# Patient Record
Sex: FEMALE | Race: WHITE | NOT HISPANIC OR LATINO | Employment: OTHER | URBAN - METROPOLITAN AREA
[De-identification: names, ages, dates, MRNs, and addresses within clinical notes are randomized per-mention and may not be internally consistent; named-entity substitution may affect disease eponyms.]

---

## 2020-03-11 ENCOUNTER — TELEPHONE (OUTPATIENT)
Dept: PAIN MEDICINE | Facility: CLINIC | Age: 70
End: 2020-03-11

## 2020-03-11 NOTE — TELEPHONE ENCOUNTER
"  Chief Complaint: \"Pain in my lower back.\"       History of Present Illness:   Patient: Ms. Orquidea Dodge, 69 y.o. female   Referring Physician: Abby Jaime APRN   Reason for Referral: Consultation for chronic intractable lower back pain.   Pain History: Patient reports a _____ year history of lower back pain, which began without incident. Pain has progressed in intensity over the past ______years.   Pain Description: constant pain with intermittent exacerbation, described as aching, burning, dull and throbbing sensation.   Radiation of Pain: The pain does not radiate.   Pain intensity today: _____/10   Average pain intensity last week: ____/10   Pain intensity ranges from: ____/10 to ____/10   Aggravating factors:   Pain increases with twisting, bending, standing longer than _____ and walking______.   Alleviating factors: Pain decreases with sitting and lying.     Associated Symptoms:   Patient denies pain, numbness or weakness in the lower extremities.   Patient denies any new bladder or bowel problems.   Patient denies difficulties with her balance or recent falls     Review of previous therapies and additional medical records:   Orquidea Dodge has already failed the following measures, including:   Conservative Measures: Oral analgesics, topical analgesics and physical therapy   Interventional Measures: _____None   Surgical Measures: _____No previous history of lumbar spine surgery   Orquidea Dodge ____underwent neurosurgical consultation with  ___- on ______, and was found not to be a surgical candidate.   Patient underwent neurology consultation with Dr. Henok Fletcher on 06/11/2019 and was diagnosed with diabetic polyneuropathy   Orquidea Dodge presents with significant comorbidities including   _________ diabetes, diabetic polyneuropathy, CAD,HTN, osteoporosis, OSA_____, _____nicotine addiction, COPD   In terms of current analgesics, Orquidea Dodge takes: ______   I have reviewed [facility] Report " #________ consistent to medication reconciliation.   SOAPP: ____________     Review of Diagnostic Studies:     MRI of the lumbar spine 01/15/2020, radiology report: grade 1 spondylolisthesis of L4 on L5. The conus has normal appearance and level of termination. Axial imaging:   L1-L2: Mild facet arthritis. No canal or NF stenosis   L2-L3: Moderate facet arthritis. No canal or NF stenosis   L3-L4: Moderate facet arthritis. No canal or NF stenosis   L4-L5: Severe facet arthritis. No canal or NF stenosis   L5-S1: Severe facet arthritis. No canal or NF stenosis

## 2020-03-14 PROBLEM — M81.0 OSTEOPOROSIS: Status: ACTIVE | Noted: 2020-03-14

## 2020-03-14 PROBLEM — E66.01 MORBID OBESITY: Status: ACTIVE | Noted: 2020-03-14

## 2020-03-14 PROBLEM — G47.33 OSA (OBSTRUCTIVE SLEEP APNEA): Status: ACTIVE | Noted: 2020-03-14

## 2020-03-14 PROBLEM — Z71.6 TOBACCO ABUSE COUNSELING: Status: ACTIVE | Noted: 2020-03-14

## 2020-03-14 PROBLEM — J44.9 CHRONIC OBSTRUCTIVE PULMONARY DISEASE: Status: ACTIVE | Noted: 2020-03-14

## 2020-03-14 PROBLEM — M51.37 DEGENERATION OF LUMBAR OR LUMBOSACRAL INTERVERTEBRAL DISC: Status: ACTIVE | Noted: 2020-03-14

## 2020-03-14 PROBLEM — E11.42 DIABETIC POLYNEUROPATHY ASSOCIATED WITH TYPE 2 DIABETES MELLITUS (HCC): Status: ACTIVE | Noted: 2020-03-14

## 2020-03-14 PROBLEM — M51.379 DEGENERATION OF LUMBAR OR LUMBOSACRAL INTERVERTEBRAL DISC: Status: ACTIVE | Noted: 2020-03-14

## 2020-03-14 PROBLEM — M47.816 SPONDYLOSIS OF LUMBAR REGION WITHOUT MYELOPATHY OR RADICULOPATHY: Status: ACTIVE | Noted: 2020-03-14

## 2020-03-14 PROBLEM — E66.9 DIABETES MELLITUS TYPE 2 IN OBESE: Status: ACTIVE | Noted: 2020-03-14

## 2020-03-14 PROBLEM — E11.69 DIABETES MELLITUS TYPE 2 IN OBESE (HCC): Status: ACTIVE | Noted: 2020-03-14

## 2020-03-14 PROBLEM — F17.200 CURRENT EVERY DAY SMOKER: Status: ACTIVE | Noted: 2020-03-14

## 2020-11-05 ENCOUNTER — TRANSCRIBE ORDERS (OUTPATIENT)
Dept: PAIN MEDICINE | Facility: CLINIC | Age: 70
End: 2020-11-05

## 2020-11-05 DIAGNOSIS — M47.819 ARTHRITIS OF SPINE: Primary | ICD-10-CM

## 2020-11-12 NOTE — PROGRESS NOTES
"Chief Complaint: \"Pain in my lower back\"      History of Present Illness:   Patient: Ms. Orquidea Dodge, 70 y.o. female   Referring Physician: RADHA Stone  Reason for Referral: Consultation for chronic intractable lower back pain.   Pain History: Patient reports a 30-year history of lower back pain, which began without incident.  Patient is to live in [place] and underwent several procedures at the pain clinic, with sounds like medial branch blocks, trigger point injections and possible epidurals.  Records are not available for review.  MRI of the lumbar spine from January 2020 revealed multilevel lumbar facet arthropathy. No significant canal or foraminal stenosis.  Pain has progressed in intensity over the past months.   Pain Description: Constant lower back pain with intermittent exacerbation, described as aching, burning and throbbing sensation.   Radiation of Pain: The pain does not radiate.  Pain intensity today: 9/10  Average pain intensity last week: 9/10  Pain intensity ranges from: 8/10 to 10/10  Aggravating factors: Pain increases with twisting, bending, standing, walking. Patient describes claudication   Alleviating factors: Pain decreases with sitting down, lying down   Associated Symptoms:   Patient denies pain, numbness and weakness in the lower extremities   Patient denies any new bladder or bowel problems  Patient reports difficulties with her balance but denies recent falls     Review of previous therapies and additional medical records:  Orquidea Dodge has already failed the following measures, including:   Conservative Measures: Oral analgesics, topical analgesics and physical therapy   Interventional Measures: Numerous procedures in [place]   Surgical Measures: No history of previous lumbar spine or hip surgery   Orquidea Dodge denies previous neurosurgical or orthopedic spine consultation for her back pain  Patient underwent neurology consultation with Dr. Henok Warren on June 11, 2019 " and was diagnosed with diabetic polyneuropathy  Orquidea Dodge presents with significant comorbidities including diabetes, diabetic polyneuropathy, CAD s/p stents x3, on Plavix, hypertension, osteoporosis, ulcer, COPD, nicotine addiction 1PPD  In terms of current analgesics, Orquidea Dodge takes: Cyclobenzaprine.  Patient also takes Celexa.  I have reviewed [facility] Report #[id number] (NO CS) consistent with medication reconciliation.  SOAPP: Pt did not complete    Global Pain Scale 11-24  2020          Pain 23          Feelings 18          Clinical outcomes 18          Activities 20          GPS Total: 79            Review of Diagnostic Studies:   MRI of the lumbar spine January 15, 2020, I have reviewed the images: The conus terminates at L1 and has a normal appearance.  Vertebral body height and alignment are preserved except for grade 1 spondylolisthesis of L4 on L5.  Axial imaging:  L1-L2, L2-L3, L3-L4: Moderate facet arthritis.  No significant canal or foraminal stenosis  L4-L5, L5-S1: Severe facet arthritis.  Moderate canal stenosis. No foraminal stenosis  L5-S1: Severe facet arthritis.  No significant canal or foraminal stenosis    Review of Systems   Constitutional: Positive for fatigue.   HENT: Positive for sinus pressure.    Eyes: Positive for itching.   Respiratory: Positive for apnea.    Cardiovascular: Positive for palpitations and leg swelling.   Endocrine: Positive for polydipsia.   Musculoskeletal: Positive for arthralgias, back pain and joint swelling.   Allergic/Immunologic: Positive for environmental allergies.   Hematological: Bruises/bleeds easily.   All other systems reviewed and are negative.        Patient Active Problem List   Diagnosis   • MARIANO (obstructive sleep apnea)   • Current every day smoker   • Tobacco abuse counseling   • Chronic obstructive pulmonary disease (CMS/HCC)   • Diabetic polyneuropathy associated with type 2 diabetes mellitus (CMS/[facility])   • Osteoporosis   • Spondylosis of  lumbar region without myelopathy or radiculopathy   • Degeneration of lumbar or lumbosacral intervertebral disc   • Diabetes mellitus type 2 in obese (CMS/[facility])   • Morbid obesity (CMS/[facility])   • PAD (peripheral artery disease) (CMS/[facility])   • Lumbar stenosis with neurogenic claudication       Past Medical History:   Diagnosis Date   • Chronic pain disorder    • Fractures    • Joint pain    • Lumbosacral disc disease    • Neuropathy in diabetes (CMS/[facility])    • Osteoarthritis          Past Surgical History:   Procedure Laterality Date   • EPIDURAL BLOCK     • TRIGGER POINT INJECTION           Family History   Problem Relation Age of Onset   • Alzheimer's disease Mother    • Alcohol abuse Father    • Coronary artery disease Sister    • Hypertension Sister    • Coronary artery disease Brother    • Hypertension Brother    • Arthritis Daughter    • Diabetes Daughter    • Arthritis Son    • Diabetes Son          Social History     Socioeconomic History   • Marital status:      Spouse name: Not on file   • Number of children: Not on file   • Years of education: Not on file   • Highest education level: Not on file   Tobacco Use   • Smoking status: Heavy Tobacco Smoker     Packs/day: 1.00     Years: 53.00     Pack years: 53.00   Substance and Sexual Activity   • Alcohol use: Yes     Comment: occasional   • Drug use: Never   • Sexual activity: Not Currently           Current Outpatient Medications:   •  cetirizine (zyrTEC) 10 MG tablet, Take 10 mg by mouth Daily., Disp: , Rfl:   •  citalopram (CeleXA) 40 MG tablet, Take 40 mg by mouth Daily., Disp: , Rfl:   •  clopidogrel (PLAVIX) 75 MG tablet, Take 75 mg by mouth Daily., Disp: , Rfl:   •  cyclobenzaprine (FLEXERIL) 10 MG tablet, Take 10 mg by mouth Daily., Disp: , Rfl:   •  esomeprazole (nexIUM) 40 MG capsule, Take 40 mg by mouth Every Morning Before Breakfast., Disp: , Rfl:   •  guaiFENesin (MUCINEX) 600 MG 12 hr tablet, Take 1,200 mg by mouth 2 (Two) Times a Day., Disp: ,  "Rfl:   •  lisinopril (PRINIVIL,ZESTRIL) 10 MG tablet, Take 10 mg by mouth Daily., Disp: , Rfl:   •  metFORMIN (GLUCOPHAGE) 1000 MG tablet, Take 1,000 mg by mouth 2 (Two) Times a Day With Meals., Disp: , Rfl:       Allergies   Allergen Reactions   • Vicodin [Hydrocodone-Acetaminophen] Hives         /72   Pulse 90   Temp 98.6 °F (37 °C)   Resp 16   Ht 170.2 cm (67\")   Wt 98.2 kg (216 lb 9.6 oz)   SpO2 98%   BMI 33.92 kg/m²       Physical Exam:  Constitutional: Patient appears well-developed and well-nourished.   HEENT: Head: Normocephalic and atraumatic.   Eyes: Conjunctivae and lids are normal.   Pupils: Equal, round, reactive to light.   Neck: Trachea normal. Neck supple. No JVD present.   Pulmonary Respiratory effort: No increased work of breathing or signs of respiratory distress. Auscultation of lungs: Clear to auscultation.   Cardiovascular Auscultation of heart: Normal rate and rhythm, normal S1 and S2, no murmurs.   Peripheral vascular exam: Femoral: right 1+, left 1+. Posterior tibialis: right trace and left trace. Dorsalis pedis: right trace, and left trace. Delayed CRT.  Musculoskeletal   Gait and station: Gait evaluation demonstrated a normal gait   Lumbar spine: Passive and active range of motion are limited secondary to pain. Extension, lateral flexion, rotation of the lumbar spine increased and reproduced pain. Lumbar facet joint loading maneuvers are positive.  Iasías test and Gaenslen's test are negative   Piriformis maneuvers are negative   Palpation of the bilateral greater trochanter, unrevealing   Examination of the Iliotibial band: unrevealing   Hip joints: The range of motion of the hip joints is slightly decreased, symmetrical, and without significant pain   Neurological:   Patient is alert and oriented to person, place, and time.   Speech: speech is normal.   Cortical function: Normal mental status.   Cranial nerves: Cranial nerves 2-12 intact.   Reflex Scores:  Right patellar: " 2+  Left patellar: 2+  Right Achilles: 0+  Left Achilles: 0+  Motor strength: 5/5  Motor Tone: normal tone.   Involuntary movements: none.   Superficial/Primitive Reflexes: primitive reflexes were absent.   Right Zavaleta: absent  Left Zavaleta: absent  Right ankle clonus: absent  Left ankle clonus: absent   Babinsky: absent  Negative long tract signs. Straight leg raising test is negative.   Sensory exam: intact to light touch, intact pain and temperature sensation, intact vibration sensation and normal proprioception.   Coordination: Normal finger to nose and heel to shin. Normal balance and negative Romberg's sign   Skin and subcutaneous tissue: Skin is warm and intact. No rash noted. No cyanosis.   Psychiatric: Judgment and insight: Normal. Orientation to person, place and time: Normal. Recent and remote memory: Intact. Mood and affect: Normal.     ASSESSMENT:   1. Spondylosis of lumbar region without myelopathy or radiculopathy    2. Degeneration of lumbar or lumbosacral intervertebral disc    3. Lumbar stenosis with neurogenic claudication    4. PAD (peripheral artery disease) (CMS/[facility])    5. Osteoporosis, unspecified osteoporosis type, unspecified pathological fracture presence    6. Diabetic polyneuropathy associated with type 2 diabetes mellitus (CMS/[facility])    7. Morbid obesity (CMS/[facility])    8. MARIANO (obstructive sleep apnea)    9. Chronic obstructive pulmonary disease, unspecified COPD type (CMS/[facility])    10. Current every day smoker    11. Tobacco abuse counseling        PLAN/MEDICAL DECISION MAKING: Patient reports a 30-year history of lower back pain. Patient used to live in [place] and underwent several procedures at a pain clinic there. MRI of the lumbar spine from January 2020 revealed exaggerated lordosis, multilevel lumbar facet arthropathy. Moderate Lumbar stenosis at L4-L5. Pain has progressed in intensity over the past months.  In addition, patient presents with a significant history of peripheral  arterial disease with vascular claudication.  It appears that she may be dealing with both, neurogenic and vascular claudication to some extent. Patient has failed to obtain pain relief with conservative measures, as referenced under HPI.  Patient reports significant analgesic benefit from previous interventional pain management measures. I have reviewed all available patient's medical records as well as previous therapies as referenced above. I had a lengthy conversation with Ms. Orquidea Dodge regarding her chronic pain condition and potential therapeutic options including risks, benefits, alternative therapies, to name a few. Therefore, I have proposed the following plan:  1. Diagnostic studies:   A. Flexion and extension x-rays of the lumbar spine  B. Patient needs vascular studies of her lower extremities.  She reports that she is under the care of a cardiovascular surgeon in [place] who is taking care of this issue  2. Pharmacological measures: Reviewed. Discussed. Patient takes cyclobenzaprine.  Patient also takes Celexa. Patient has declined additional pharmacological measures   3. Interventional pain management measures: Patient will be scheduled for diagnostic bilateral lumbar medial branch blocks at L2, L3, L4; for bilateral lumbar facet joints at L3-L4, L4-L5, to clarify the origin of chronic refractory mechanical lower back pain. If patient experiences more than 80% relief along with significant improvement the range of motion of the lumbar spine, then, patient will be scheduled for a second set of diagnostic bilateral lumbar medial branch blocks, to then, proceed with bilateral lumbar medial branch rhizotomies. Other options would include the possibility of diagnostic and therapeutic bilateral L4-L5 transforaminal epidural steroid injections.  In that case, patient will need to stop Plavix for about 7 days prior to her procedure.  If she continues to struggle with pain, then, we may obtain lumbar  myelogram followed by CT post myelogram and facilitate the referral for NSA consultation  4. Long-term rehabilitation efforts:  A. The patient does not have a history of falls. I did complete a risk assessment for falls  B. Patient will start a comprehensive physical therapy program at [facility] for core strengthening, gait and balance training, neurodynamics, ASTYM, myofascial release, cupping and dry needling   C. Start an exercise program such as yoga and water therapy  D. Contrast therapy: Apply ice-packs for 15-20 minutes, followed by heating pads for 15-20 minutes to affected area   E. Referral to [facility]. Patient counseled on the importance of weight loss to help with overall health and pain control. Patient instructed to attempt weight loss.    F. Patient declined a referral to Dr. Sridhar Wing for cognitive behavioral therapy.  G. Patient has been screened for tobacco use: Current tobacco user. Smoking Cessation: I have advised the patient at length regarding the long-term deleterious effects of smoking and have provided the patient lifestyle modification strategies to facilitate smoking cessation. I have offered NRT, which patient declined at this time. I have instructed the patient to delay the time that he lights his first cigarette in the morning from 1 hour to 2 hours and to continue pushing back 30-60 minutes, if possible, every day for the rest of the week. We have also discussed pharmacological interventions in addition to participation in support groups, individual counseling, to name a few to facilitate smoking/nicotine cessation. I spent approximately 6 minutes advising the patient.   5. The patient has been instructed to contact my office with any questions or difficulties. The patient understands the plan and agrees to proceed accordingly.        Patient Care Team:  Abby Jaime APRN as PCP - General (General Practice)     No orders of the defined  types were placed in this encounter.        No future appointments.      Aashish Allen MD     EMR Dragon/Transcription disclaimer:  Much of this encounter note is an electronic transcription of spoken language to printed text. Electronic transcription of spoken language may permit erroneous, or at times, nonsensical words or phrases to be inadvertently transcribed. Although I have reviewed the note for such errors, some may still exist.

## 2020-11-24 ENCOUNTER — OFFICE VISIT (OUTPATIENT)
Dept: PAIN MEDICINE | Facility: CLINIC | Age: 70
End: 2020-11-24

## 2020-11-24 VITALS
HEART RATE: 90 BPM | SYSTOLIC BLOOD PRESSURE: 140 MMHG | TEMPERATURE: 98.6 F | OXYGEN SATURATION: 98 % | WEIGHT: 216.6 LBS | DIASTOLIC BLOOD PRESSURE: 72 MMHG | RESPIRATION RATE: 16 BRPM | BODY MASS INDEX: 34 KG/M2 | HEIGHT: 67 IN

## 2020-11-24 DIAGNOSIS — E66.01 MORBID OBESITY (HCC): ICD-10-CM

## 2020-11-24 DIAGNOSIS — Z71.6 TOBACCO ABUSE COUNSELING: ICD-10-CM

## 2020-11-24 DIAGNOSIS — M48.062 LUMBAR STENOSIS WITH NEUROGENIC CLAUDICATION: ICD-10-CM

## 2020-11-24 DIAGNOSIS — J44.9 CHRONIC OBSTRUCTIVE PULMONARY DISEASE, UNSPECIFIED COPD TYPE (HCC): ICD-10-CM

## 2020-11-24 DIAGNOSIS — I73.9 PAD (PERIPHERAL ARTERY DISEASE) (HCC): ICD-10-CM

## 2020-11-24 DIAGNOSIS — G47.33 OSA (OBSTRUCTIVE SLEEP APNEA): ICD-10-CM

## 2020-11-24 DIAGNOSIS — E11.42 DIABETIC POLYNEUROPATHY ASSOCIATED WITH TYPE 2 DIABETES MELLITUS (HCC): ICD-10-CM

## 2020-11-24 DIAGNOSIS — F17.200 CURRENT EVERY DAY SMOKER: ICD-10-CM

## 2020-11-24 DIAGNOSIS — M51.37 DEGENERATION OF LUMBAR OR LUMBOSACRAL INTERVERTEBRAL DISC: ICD-10-CM

## 2020-11-24 DIAGNOSIS — M81.0 OSTEOPOROSIS, UNSPECIFIED OSTEOPOROSIS TYPE, UNSPECIFIED PATHOLOGICAL FRACTURE PRESENCE: ICD-10-CM

## 2020-11-24 DIAGNOSIS — M47.816 SPONDYLOSIS OF LUMBAR REGION WITHOUT MYELOPATHY OR RADICULOPATHY: Primary | ICD-10-CM

## 2020-11-24 DIAGNOSIS — M47.816 SPONDYLOSIS OF LUMBAR REGION WITHOUT MYELOPATHY OR RADICULOPATHY: ICD-10-CM

## 2020-11-24 PROCEDURE — 99204 OFFICE O/P NEW MOD 45 MIN: CPT | Performed by: ANESTHESIOLOGY

## 2020-11-24 RX ORDER — CITALOPRAM 40 MG/1
40 TABLET ORAL DAILY
COMMUNITY
Start: 2020-11-02

## 2020-11-24 RX ORDER — ESOMEPRAZOLE MAGNESIUM 40 MG/1
40 CAPSULE, DELAYED RELEASE ORAL
COMMUNITY

## 2020-11-24 RX ORDER — CLOPIDOGREL BISULFATE 75 MG/1
75 TABLET ORAL DAILY
COMMUNITY
End: 2021-07-13 | Stop reason: ALTCHOICE

## 2020-11-24 RX ORDER — GUAIFENESIN 600 MG/1
1200 TABLET, EXTENDED RELEASE ORAL 2 TIMES DAILY
COMMUNITY
End: 2021-07-13

## 2020-11-24 RX ORDER — CETIRIZINE HYDROCHLORIDE 10 MG/1
10 TABLET ORAL DAILY
COMMUNITY

## 2020-11-24 RX ORDER — CYCLOBENZAPRINE HCL 10 MG
10 TABLET ORAL DAILY
COMMUNITY

## 2020-11-24 RX ORDER — LISINOPRIL 10 MG/1
10 TABLET ORAL DAILY
COMMUNITY
End: 2021-07-13

## 2020-12-15 ENCOUNTER — TRANSCRIBE ORDERS (OUTPATIENT)
Dept: ADMINISTRATIVE | Facility: HOSPITAL | Age: 70
End: 2020-12-15

## 2020-12-15 DIAGNOSIS — Z01.818 OTHER SPECIFIED PRE-OPERATIVE EXAMINATION: Primary | ICD-10-CM

## 2021-01-04 ENCOUNTER — LAB (OUTPATIENT)
Dept: LAB | Facility: HOSPITAL | Age: 71
End: 2021-01-04

## 2021-01-04 ENCOUNTER — HOSPITAL ENCOUNTER (OUTPATIENT)
Dept: GENERAL RADIOLOGY | Facility: HOSPITAL | Age: 71
Discharge: HOME | End: 2021-01-04

## 2021-01-04 DIAGNOSIS — M47.816 SPONDYLOSIS OF LUMBAR REGION WITHOUT MYELOPATHY OR RADICULOPATHY: ICD-10-CM

## 2021-01-04 DIAGNOSIS — Z01.818 OTHER SPECIFIED PRE-OPERATIVE EXAMINATION: ICD-10-CM

## 2021-01-04 LAB — SARS-COV-2 RNA RESP QL NAA+PROBE: NOT DETECTED

## 2021-01-04 PROCEDURE — U0004 COV-19 TEST NON-CDC HGH THRU: HCPCS | Performed by: ANESTHESIOLOGY

## 2021-01-04 PROCEDURE — 72114 X-RAY EXAM L-S SPINE BENDING: CPT

## 2021-01-04 PROCEDURE — C9803 HOPD COVID-19 SPEC COLLECT: HCPCS

## 2021-01-04 PROCEDURE — 72114 X-RAY EXAM L-S SPINE BENDING: CPT | Performed by: RADIOLOGY

## 2021-01-06 ENCOUNTER — OUTSIDE FACILITY SERVICE (OUTPATIENT)
Dept: PAIN MEDICINE | Facility: CLINIC | Age: 71
End: 2021-01-06

## 2021-01-06 DIAGNOSIS — M47.816 SPONDYLOSIS OF LUMBAR REGION WITHOUT MYELOPATHY OR RADICULOPATHY: Primary | ICD-10-CM

## 2021-01-06 PROCEDURE — 64493 INJ PARAVERT F JNT L/S 1 LEV: CPT | Performed by: ANESTHESIOLOGY

## 2021-01-06 PROCEDURE — 99152 MOD SED SAME PHYS/QHP 5/>YRS: CPT | Performed by: ANESTHESIOLOGY

## 2021-01-06 PROCEDURE — 64494 INJ PARAVERT F JNT L/S 2 LEV: CPT | Performed by: ANESTHESIOLOGY

## 2021-01-07 ENCOUNTER — TRANSCRIBE ORDERS (OUTPATIENT)
Dept: ADMINISTRATIVE | Facility: HOSPITAL | Age: 71
End: 2021-01-07

## 2021-01-07 ENCOUNTER — TELEPHONE (OUTPATIENT)
Dept: PAIN MEDICINE | Facility: CLINIC | Age: 71
End: 2021-01-07

## 2021-01-07 DIAGNOSIS — Z01.818 PRE-OPERATIVE CLEARANCE: Primary | ICD-10-CM

## 2021-01-07 NOTE — TELEPHONE ENCOUNTER
diagnostic bilateral lumbar medial branch blocks at L2, L3, L4; for bilateral lumbar facet joints at L3-L4, L4-L5 01/06/2021.    Spoke with patient, she reports she is doing well. She is complaining of a pain going into her right hip.   Advised patient to contact us if the pain persists or worsens, she verbalized understanding.     I informed patient of her 2nd set of branch blocks scheduled on 01/25, information placed in outgoing mail.

## 2021-01-21 DIAGNOSIS — M47.816 SPONDYLOSIS OF LUMBAR REGION WITHOUT MYELOPATHY OR RADICULOPATHY: Primary | ICD-10-CM

## 2021-01-22 ENCOUNTER — LAB (OUTPATIENT)
Dept: LAB | Facility: HOSPITAL | Age: 71
End: 2021-01-22

## 2021-01-22 DIAGNOSIS — Z01.818 PRE-OPERATIVE CLEARANCE: ICD-10-CM

## 2021-01-22 LAB — SARS-COV-2 RNA RESP QL NAA+PROBE: NOT DETECTED

## 2021-01-22 PROCEDURE — U0004 COV-19 TEST NON-CDC HGH THRU: HCPCS | Performed by: ANESTHESIOLOGY

## 2021-01-22 PROCEDURE — C9803 HOPD COVID-19 SPEC COLLECT: HCPCS

## 2021-01-25 ENCOUNTER — OUTSIDE FACILITY SERVICE (OUTPATIENT)
Dept: PAIN MEDICINE | Facility: CLINIC | Age: 71
End: 2021-01-25

## 2021-01-25 PROCEDURE — 64494 INJ PARAVERT F JNT L/S 2 LEV: CPT | Performed by: ANESTHESIOLOGY

## 2021-01-25 PROCEDURE — 99152 MOD SED SAME PHYS/QHP 5/>YRS: CPT | Performed by: ANESTHESIOLOGY

## 2021-01-25 PROCEDURE — 64493 INJ PARAVERT F JNT L/S 1 LEV: CPT | Performed by: ANESTHESIOLOGY

## 2021-01-26 ENCOUNTER — TELEPHONE (OUTPATIENT)
Dept: PAIN MEDICINE | Facility: CLINIC | Age: 71
End: 2021-01-26

## 2021-01-26 DIAGNOSIS — M47.816 SPONDYLOSIS OF LUMBAR REGION WITHOUT MYELOPATHY OR RADICULOPATHY: Primary | ICD-10-CM

## 2021-01-26 NOTE — TELEPHONE ENCOUNTER
Pt claims she is doing well, and is looking forward to her RFTC, I gave her the date, pt is aware to call us if she needs anything.

## 2021-02-11 ENCOUNTER — TRANSCRIBE ORDERS (OUTPATIENT)
Dept: ADMINISTRATIVE | Facility: HOSPITAL | Age: 71
End: 2021-02-11

## 2021-02-11 DIAGNOSIS — Z01.818 OTHER SPECIFIED PRE-OPERATIVE EXAMINATION: Primary | ICD-10-CM

## 2021-02-15 ENCOUNTER — LAB (OUTPATIENT)
Dept: LAB | Facility: HOSPITAL | Age: 71
End: 2021-02-15

## 2021-02-15 DIAGNOSIS — Z01.818 OTHER SPECIFIED PRE-OPERATIVE EXAMINATION: ICD-10-CM

## 2021-02-15 PROCEDURE — U0004 COV-19 TEST NON-CDC HGH THRU: HCPCS

## 2021-02-15 PROCEDURE — C9803 HOPD COVID-19 SPEC COLLECT: HCPCS

## 2021-02-16 LAB — SARS-COV-2 RNA RESP QL NAA+PROBE: NOT DETECTED

## 2021-02-17 ENCOUNTER — OUTSIDE FACILITY SERVICE (OUTPATIENT)
Dept: PAIN MEDICINE | Facility: CLINIC | Age: 71
End: 2021-02-17

## 2021-02-17 DIAGNOSIS — M47.816 SPONDYLOSIS OF LUMBAR REGION WITHOUT MYELOPATHY OR RADICULOPATHY: Primary | ICD-10-CM

## 2021-02-17 PROCEDURE — 64635 DESTROY LUMB/SAC FACET JNT: CPT | Performed by: ANESTHESIOLOGY

## 2021-02-17 PROCEDURE — 99152 MOD SED SAME PHYS/QHP 5/>YRS: CPT | Performed by: ANESTHESIOLOGY

## 2021-02-17 PROCEDURE — 64636 DESTROY L/S FACET JNT ADDL: CPT | Performed by: ANESTHESIOLOGY

## 2021-02-24 ENCOUNTER — HOSPITAL ENCOUNTER (INPATIENT)
Dept: HOSPITAL 79 - ER1 | Age: 71
LOS: 7 days | Discharge: HOME | DRG: 956 | End: 2021-03-03
Attending: HOSPITALIST | Admitting: EMERGENCY MEDICINE
Payer: COMMERCIAL

## 2021-02-24 VITALS — HEIGHT: 67 IN | WEIGHT: 210 LBS | BODY MASS INDEX: 32.96 KG/M2

## 2021-02-24 DIAGNOSIS — Z95.5: ICD-10-CM

## 2021-02-24 DIAGNOSIS — E66.9: ICD-10-CM

## 2021-02-24 DIAGNOSIS — K59.00: ICD-10-CM

## 2021-02-24 DIAGNOSIS — I95.9: ICD-10-CM

## 2021-02-24 DIAGNOSIS — R65.11: ICD-10-CM

## 2021-02-24 DIAGNOSIS — N17.9: ICD-10-CM

## 2021-02-24 DIAGNOSIS — T79.6XXA: ICD-10-CM
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
## 2021-02-24 DIAGNOSIS — J44.9: ICD-10-CM

## 2021-02-24 DIAGNOSIS — D72.829: ICD-10-CM
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
## 2021-02-24 DIAGNOSIS — Z82.49: ICD-10-CM

## 2021-02-24 DIAGNOSIS — E87.1: ICD-10-CM

## 2021-02-24 DIAGNOSIS — S72.141A: Primary | ICD-10-CM

## 2021-02-24 DIAGNOSIS — M19.90: ICD-10-CM
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
## 2021-02-24 DIAGNOSIS — I25.10: ICD-10-CM

## 2021-02-24 DIAGNOSIS — I10: ICD-10-CM

## 2021-02-24 DIAGNOSIS — E11.9: ICD-10-CM

## 2021-02-24 DIAGNOSIS — F17.210: ICD-10-CM

## 2021-02-24 DIAGNOSIS — Z90.49: ICD-10-CM

## 2021-02-24 DIAGNOSIS — Z90.710: ICD-10-CM

## 2021-02-24 DIAGNOSIS — D62: ICD-10-CM

## 2021-02-24 DIAGNOSIS — E87.2: ICD-10-CM

## 2021-02-24 DIAGNOSIS — D50.9: ICD-10-CM

## 2021-02-24 DIAGNOSIS — Z20.822: ICD-10-CM

## 2021-02-24 PROCEDURE — P9016 RBC LEUKOCYTES REDUCED: HCPCS

## 2021-02-24 PROCEDURE — C1713 ANCHOR/SCREW BN/BN,TIS/BN: HCPCS

## 2021-02-24 PROCEDURE — U0002 COVID-19 LAB TEST NON-CDC: HCPCS

## 2021-02-25 LAB
BUN/CREATININE RATIO: 8 (ref 0–10)
HGB BLD-MCNC: 10 GM/DL (ref 12.3–15.3)
HGB BLD-MCNC: 10.8 GM/DL (ref 12.3–15.3)
HGB BLD-MCNC: 12.3 GM/DL (ref 12.3–15.3)
RED BLOOD COUNT: 4.73 M/UL (ref 4–5.1)
WHITE BLOOD COUNT: 15.1 K/UL (ref 4.5–11)

## 2021-02-25 PROCEDURE — 0QS604Z REPOSITION RIGHT UPPER FEMUR WITH INTERNAL FIXATION DEVICE, OPEN APPROACH: ICD-10-PCS | Performed by: ORTHOPAEDIC SURGERY

## 2021-02-25 PROCEDURE — 30233N1 TRANSFUSION OF NONAUTOLOGOUS RED BLOOD CELLS INTO PERIPHERAL VEIN, PERCUTANEOUS APPROACH: ICD-10-PCS | Performed by: EMERGENCY MEDICINE

## 2021-02-26 LAB
BUN/CREATININE RATIO: 10 (ref 0–10)
HGB BLD-MCNC: 10.3 GM/DL (ref 12.3–15.3)
RED BLOOD COUNT: 3.82 M/UL (ref 4–5.1)
WHITE BLOOD COUNT: 18.9 K/UL (ref 4.5–11)

## 2021-02-27 LAB
BUN/CREATININE RATIO: 23 (ref 0–10)
BUN/CREATININE RATIO: 27 (ref 0–10)
HGB BLD-MCNC: 10.1 GM/DL (ref 12.3–15.3)
RED BLOOD COUNT: 3.71 M/UL (ref 4–5.1)
WHITE BLOOD COUNT: 23 K/UL (ref 4.5–11)

## 2021-02-28 LAB
BUN/CREATININE RATIO: 33 (ref 0–10)
HGB BLD-MCNC: 7.3 GM/DL (ref 12.3–15.3)
RED BLOOD COUNT: 2.66 M/UL (ref 4–5.1)
WHITE BLOOD COUNT: 22 K/UL (ref 4.5–11)

## 2021-03-01 LAB
BUN/CREATININE RATIO: 31 (ref 0–10)
HGB BLD-MCNC: 6.1 GM/DL (ref 12.3–15.3)
RED BLOOD COUNT: 2.24 M/UL (ref 4–5.1)
WHITE BLOOD COUNT: 18.9 K/UL (ref 4.5–11)

## 2021-03-01 PROCEDURE — 30233N1 TRANSFUSION OF NONAUTOLOGOUS RED BLOOD CELLS INTO PERIPHERAL VEIN, PERCUTANEOUS APPROACH: ICD-10-PCS | Performed by: EMERGENCY MEDICINE

## 2021-03-02 LAB
BUN/CREATININE RATIO: 21 (ref 0–10)
HGB BLD-MCNC: 7.5 GM/DL (ref 12.3–15.3)
RED BLOOD COUNT: 2.69 M/UL (ref 4–5.1)
WHITE BLOOD COUNT: 16 K/UL (ref 4.5–11)

## 2021-03-03 LAB
BUN/CREATININE RATIO: 13 (ref 0–10)
HGB BLD-MCNC: 6.9 GM/DL (ref 12.3–15.3)
RED BLOOD COUNT: 2.46 M/UL (ref 4–5.1)
WHITE BLOOD COUNT: 15.4 K/UL (ref 4.5–11)

## 2021-03-03 NOTE — NUR
CALLED REPORT TO APRIL AT [facility],  ALSO PT TOO HER O DRSGN OFF AND I PLACE
A TELFA ON BOTH STAPLE SITES , NO REDNESS OR SWELLING NOTED , WOUND
APPROXIMATED WELL

## 2021-06-28 ENCOUNTER — HOSPITAL ENCOUNTER (OUTPATIENT)
Dept: HOSPITAL 79 - EXRD | Age: 71
End: 2021-06-28
Attending: NURSE PRACTITIONER
Payer: COMMERCIAL

## 2021-06-28 DIAGNOSIS — S72.301S: Primary | ICD-10-CM

## 2021-06-28 DIAGNOSIS — Z87.81: ICD-10-CM

## 2021-06-28 DIAGNOSIS — M85.88: ICD-10-CM

## 2021-07-10 PROBLEM — K21.9 GERD (GASTROESOPHAGEAL REFLUX DISEASE): Status: ACTIVE | Noted: 2021-07-10

## 2021-07-10 NOTE — PROGRESS NOTES
"[facility] at [facility]  Cardiology Consultation Note     Orquidea Dodge  1950  Requesting Provider: RADHA Stone  PCP: Abby Jaime APRN    ID:  Orquidea Dodge is a 71 y.o. female who resides in [place], [place]    REASON FOR CONSULTATION:    • CAD  • PAD  • HL         Dear RADHA Nova:    Thank you for referring Orquidea Dodge to my office today to establish cardiology care.  The patient is a 71-year-old female who was previously cared for by a cardiologist in [place] and more recently by Dr. Neal.  I have no records available for me to review at today's consultation; therefore, the history provided below is based on patient recollection.  The patient has a history of cardiac stents placed at [facility] in 2019.  She said that initially she was told she needed bypass surgery but she and her  refused and she ended up getting 2 or 3 stents placed.  She states that her symptoms resolved after stent placement.    The patient tells me that she was having swelling of her legs and that she underwent a \"procedure\" in Dr. Neal's office to address the swelling, initially in her left leg.  After getting home from the procedure, she evidently collapsed from what she thinks is one of the medicines prescribed to her post procedure.  The fall resulted in a pelvic fracture for which she is still recovering.  She walks with a cane.  She has not seen Dr. Neal since this procedure.    The patient denies having pain in her legs with walking as she is only able to walk short distances due to the pelvic fracture.  She has no ulcers on her feet.  She states that she was diagnosed with a 3.3 cm abdominal aortic aneurysm in 2013.  She has not had a recent evaluation of the aneurysm.    The patient is presently not on statin therapy.  She had been on atorvastatin but developed transaminitis and this was stopped.  She continues " to smoke cigarettes 1 pack a day.  She is trying to cut down, but is presently in a stressful situation.      Past Medical History, Past Surgical History, Family history, Social History, and Medications were all reviewed with the patient today and updated as necessary.       Current Outpatient Medications:   •  Apple Cider Vinegar 500 MG tablet, Take 1,500 mg by mouth Daily., Disp: , Rfl:   •  aspirin 81 MG EC tablet, Take 1 tablet by mouth Daily., Disp: 90 tablet, Rfl: 3  •  cetirizine (zyrTEC) 10 MG tablet, Take 10 mg by mouth Daily., Disp: , Rfl:   •  cholecalciferol (VITAMIN D3) 25 MCG (1000 UT) tablet, Take 1,000 Units by mouth Daily., Disp: , Rfl:   •  citalopram (CeleXA) 40 MG tablet, Take 40 mg by mouth Daily., Disp: , Rfl:   •  cyclobenzaprine (FLEXERIL) 10 MG tablet, Take 10 mg by mouth Daily., Disp: , Rfl:   •  esomeprazole (nexIUM) 40 MG capsule, Take 40 mg by mouth Every Morning Before Breakfast., Disp: , Rfl:   •  ferrous sulfate 325 (65 FE) MG tablet, Take 325 mg by mouth Daily With Breakfast., Disp: , Rfl:   •  montelukast (SINGULAIR) 10 MG tablet, Take 10 mg by mouth Every Night., Disp: , Rfl:   •  rivaroxaban (XARELTO) 2.5 MG tablet, Take 1 tablet by mouth Daily With Dinner., Disp: 90 tablet, Rfl: 3  •  rosuvastatin (CRESTOR) 20 MG tablet, Take 1 tablet by mouth Daily., Disp: 90 tablet, Rfl: 3    Allergies   Allergen Reactions   • Vicodin [Hydrocodone-Acetaminophen] Hives   • Atorvastatin Other (See Comments)     Transaminitis         Past Medical History:   Diagnosis Date   • Acid reflux    • Aneurysm (CMS/HCC)     Per patient, found during 2013 [facility]   • Chronic pain disorder    • COPD (chronic obstructive pulmonary disease) (CMS/HCC)    • Coronary artery disease    • Depressed    • Fractures    • Hyperlipidemia    • Hypertension    • Joint pain    • Lumbosacral disc disease    • Neuropathy in diabetes (CMS/HCC)    • Osteoarthritis        Past Surgical History:   Procedure Laterality Date   •  "APPENDECTOMY     • CARDIAC CATHETERIZATION      [facility]   • CORONARY STENT PLACEMENT      x3 2019, [facility].   • EPIDURAL BLOCK     • HIP SURGERY     • HYSTERECTOMY     • SHOULDER SURGERY     • TRIGGER POINT INJECTION         Family History   Problem Relation Age of Onset   • Alzheimer's disease Mother    • Alcohol abuse Father    • Coronary artery disease Sister    • Hypertension Sister    • Coronary artery disease Brother    • Hypertension Brother    • Arthritis Daughter    • Diabetes Daughter    • Arthritis Son    • Diabetes Son        Social History     Tobacco Use   • Smoking status: Heavy Tobacco Smoker     Packs/day: 1.00     Years: 53.00     Pack years: 53.00     Start date: 7/13/1986   • Smokeless tobacco: Never Used   Substance Use Topics   • Alcohol use: Yes     Comment: occasional       Review of Systems   Constitutional: Negative for malaise/fatigue.   Eyes: Negative for vision loss in left eye and vision loss in right eye.   Cardiovascular: Negative for chest pain, dyspnea on exertion, near-syncope, orthopnea, palpitations, paroxysmal nocturnal dyspnea and syncope.   Musculoskeletal: Negative for myalgias.   Neurological: Negative for brief paralysis, excessive daytime sleepiness, focal weakness, numbness, paresthesias and weakness.   All other systems reviewed and are negative.              /76 (BP Location: Left arm, Patient Position: Sitting, Cuff Size: Adult)   Pulse 74   Ht 170.2 cm (67\")   Wt 93.9 kg (207 lb)   SpO2 98%   BMI 32.42 kg/m²        Constitutional:       Appearance: Healthy appearance. Well-developed.   Eyes:      General: Lids are normal. No scleral icterus.     Conjunctiva/sclera: Conjunctivae normal.   HENT:      Head: Normocephalic and atraumatic.   Neck:      Thyroid: No thyromegaly.      Vascular: No carotid bruit or JVD.   Pulmonary:      Effort: Pulmonary effort is normal.      Breath sounds: Normal breath sounds. No wheezing. No " rhonchi. No rales.   Cardiovascular:      Normal rate. Regular rhythm.      Murmurs: There is no murmur.      No gallop. No rub.   Pulses:     Intact distal pulses.   Abdominal:      General: There is no distension.      Palpations: Abdomen is soft. There is no abdominal mass.   Musculoskeletal:      Cervical back: Normal range of motion. Skin:     General: Skin is warm and dry.      Findings: No rash.   Neurological:      General: No focal deficit present.      Mental Status: Alert and oriented to person, place, and time.      Gait: Gait is intact.   Psychiatric:         Attention and Perception: Attention normal.         Mood and Affect: Mood normal.         Behavior: Behavior normal.             ECG 12 Lead    Date/Time: 7/13/2021 10:41 AM  Performed by: True Johnston IV, MD  Authorized by: True Johnston IV, MD   Comparison: not compared with previous ECG   Previous ECG: no previous ECG available  Rhythm: sinus rhythm  BPM: 74    Clinical impression: normal ECG            Lab date: 5/19/2021  • FLP: , , HDL 28,   • CMP: Glu 114, BUN 5, Creat 0.96, Na 138, K 3.7, Cl 101, CO2 24, Ca 9.2, Alk Phos 130, AST 7, ALT 8  • CBC: WBC 8.6, RBC 4.30, HGB 11/0, HCT 34.4, MCV 80, MCH 25.6,   • HbA1c: 5.8           Problem List Items Addressed This Visit        Cardiology Problems    Coronary artery disease involving native coronary artery of native heart with angina pectoris (CMS/HCC) - Primary (Chronic)    Overview     · PCI at [facility], 2019         Current Assessment & Plan     · No anginal symptoms  · Continue low-dose aspirin  · Initiate statin therapy  · Obtain cardiac catheterization films from [facility]         Relevant Medications    aspirin 81 MG EC tablet    rivaroxaban (XARELTO) 2.5 MG tablet    Other Relevant Orders    ECG 12 Lead    AAA (abdominal aortic aneurysm) without rupture (CMS/HCC)    Current Assessment & Plan     · Obtain AAA ultrasound  screen         Relevant Orders    Abdominal Aortic Aneurysm Screening Medicare CAR    Hyperlipidemia LDL goal <70 (Chronic)    Overview     • High intensity statin therapy indicated given the presence of CAD  • History of transaminitis with atorvastatin         Current Assessment & Plan     · Start rosuvastatin 10 mg nightly  · CMP and lipids in 6-week         Relevant Medications    rosuvastatin (CRESTOR) 20 MG tablet    Other Relevant Orders    Comprehensive Metabolic Panel    Lipid Panel    PAD (peripheral artery disease) (CMS/HCC)    Current Assessment & Plan     · No claudication or nonhealing ulcers or rest pain to suggest critical limb ischemia  · Continue aspirin  · Change clopidogrel to rivaroxaban 2.5 mg daily  · Smoking cessation strongly recommended  · Start statin therapy         Relevant Medications    rivaroxaban (XARELTO) 2.5 MG tablet       Other    Current every day smoker    Current Assessment & Plan     · Smoking cessation recommended                        · Obtain records from Dr. Neal's office  · Obtain cath film and report from, [facility]  · Stop clopidogrel  · Start rivaroxaban 2.5 mg daily in addition to low-dose aspirin  · Start rosuvastatin 10 mg daily  · CMP and lipids in 6 weeks  Return in about 6 months (around 1/13/2022) for Follow-up with cardiology APRN/PA.          JAYLON Johnston MD, [facility], [facility]  Interventional Cardiology  07/13/21  12:31 EDT     Scribed for True Johnston IV, MD by Petrona Noel.  07/13/21   11:51 EDT    I have personally performed the services described in this document as scribed by the above individual, and it is both accurate and complete.  True Johnston IV, MD  7/13/2021  12:31 EDT

## 2021-07-12 NOTE — PROGRESS NOTES
"Chief Complaint: \"My lower back pain is okay, I had a fall in February after my procedure and I broke my hip.\"      History of Present Illness:   Patient: Ms. Orquidea Dodge, 71 y.o. female originally referred by RADHA Stone in consultation for chronic intractable lower back pain. Patient reports a 30-year history of lower back pain, which began without incident.  She was last seen on February 17, 2021, when she underwent bilateral lumbar medial branch rhizotomies, from which she reports she is currently unable to quantify the amount of relief she experienced.  Unfortunately, around the end of February 2021, she suffered a traumatic fall fracturing her femur and shattering her hip.  She had to undergo hip trochanteric nailing with additional surgical correction of her fractured femur by Dr. Mcneal in [place].  She reports it has been difficult to quantify or determine continued pain of her lower back due to the intensity and severity of her right hip pain.  She has completed physical therapy for this.  She did not undergo consultation with [facility] weight loss as recommended.  She continues to smoke.  She returns today for post procedure follow-up and evaluation.  Pain Description: Constant lower back pain with intermittent exacerbation, described as aching, burning and throbbing sensation.   Radiation of Pain: The pain does not radiate.  Pain intensity today: 7/10  Average pain intensity last week: 5/10  Pain intensity ranges from: 4/10 to 8/10  Aggravating factors: Pain increases with twisting, bending, standing, walking. Patient describes claudication.  She is ambulating with cane today.    Alleviating factors: Pain decreases with sitting down, lying down   Associated Symptoms:   Patient denies pain, numbness and weakness in the lower extremities   Patient denies any new bladder or bowel problems  Patient reports difficulties with her balance but denies recent falls within the last 3 months. "     Review of previous therapies and additional medical records:  Orquidea Dodge has already failed the following measures, including:   Conservative Measures: Oral analgesics, topical analgesics and physical therapy   Interventional Measures: Numerous procedures in [place]   02/17/2021: Bilateral lumbar RFTC  Surgical Measures: No history of previous lumbar spine or hip surgery   Orquidea Dodge denies previous neurosurgical or orthopedic spine consultation for her back pain  Patient underwent neurology consultation with Dr. Henok Warren on June 11, 2019 and was diagnosed with diabetic polyneuropathy  Orquidea Dodge presents with significant comorbidities including diabetes, diabetic polyneuropathy, CAD s/p stents x3, on Xarelto, hypertension, osteoporosis, ulcer, COPD, nicotine addiction 1PPD  In terms of current analgesics, Orquidea Dodge takes: Cyclobenzaprine.  Patient also takes Celexa.  I have reviewed [facility] Report #[id number] consistent with medication reconciliation.  SOAPP: Pt did not complete    Global Pain Scale 11-24  2020 07-14  2021         Pain 23 15         Feelings 18 3         Clinical outcomes 18 6         Activities 20 4         GPS Total: 79 28           Review of Diagnostic Studies:   Lumbar spine x-rays with flexion-extension views 1/4/2021: I have reviewed the images. Advanced facet arthritis, with no instability seen between flexion-extension views.  MRI of the lumbar spine January 15, 2020, I have reviewed the images: The conus terminates at L1 and has a normal appearance.  Vertebral body height and alignment are preserved except for grade 1 spondylolisthesis of L4 on L5.  Axial imaging:  L1-L2, L2-L3, L3-L4: Moderate facet arthritis.  No significant canal or foraminal stenosis  L4-L5, L5-S1: Severe facet arthritis.  Moderate canal stenosis. No foraminal stenosis  L5-S1: Severe facet arthritis.  No significant canal or foraminal stenosis    Review of Systems   Constitutional: Positive for  activity change.   Respiratory: Positive for apnea.    Cardiovascular: Positive for leg swelling.   Endocrine: Positive for polydipsia.   Musculoskeletal: Positive for back pain, joint swelling and myalgias.   All other systems reviewed and are negative.        Patient Active Problem List   Diagnosis   • MARIANO (obstructive sleep apnea)   • Current every day smoker   • Chronic obstructive pulmonary disease (CMS/HCC)   • Diabetic polyneuropathy associated with type 2 diabetes mellitus (CMS/[facility])   • Osteoporosis   • Spondylosis of lumbar region without myelopathy or radiculopathy   • Degeneration of lumbar or lumbosacral intervertebral disc   • PAD (peripheral artery disease) (CMS/[facility])   • Lumbar stenosis with neurogenic claudication   • GERD (gastroesophageal reflux disease)   • Hyperlipidemia LDL goal <70   • Coronary artery disease involving native coronary artery of native heart with angina pectoris (CMS/[facility])   • AAA (abdominal aortic aneurysm) without rupture (CMS/[facility])       Past Medical History:   Diagnosis Date   • Acid reflux    • Aneurysm (CMS/HCC)     Per patient, found during 2013 [facility]   • Chronic pain disorder    • COPD (chronic obstructive pulmonary disease) (CMS/[facility])    • Coronary artery disease    • Depressed    • Fractures    • Hyperlipidemia    • Hypertension    • Joint pain    • Lumbosacral disc disease    • Neuropathy in diabetes (CMS/HCC)    • Osteoarthritis          Past Surgical History:   Procedure Laterality Date   • APPENDECTOMY     • CARDIAC CATHETERIZATION      [facility]   • CORONARY STENT PLACEMENT      x3 2019, [facility].   • EPIDURAL BLOCK     • HIP SURGERY     • HYSTERECTOMY     • SHOULDER SURGERY     • TRIGGER POINT INJECTION           Family History   Problem Relation Age of Onset   • Alzheimer's disease Mother    • Alcohol abuse Father    • Coronary artery disease Sister    • Hypertension Sister    • Coronary artery disease Brother    • Hypertension Brother     • Arthritis Daughter    • Diabetes Daughter    • Arthritis Son    • Diabetes Son          Social History     Socioeconomic History   • Marital status:      Spouse name: Not on file   • Number of children: Not on file   • Years of education: Not on file   • Highest education level: Not on file   Tobacco Use   • Smoking status: Heavy Tobacco Smoker     Packs/day: 1.00     Years: 53.00     Pack years: 53.00     Start date: 7/13/1986   • Smokeless tobacco: Never Used   Vaping Use   • Vaping Use: Never used   Substance and Sexual Activity   • Alcohol use: Yes     Comment: occasional   • Drug use: Never   • Sexual activity: Not Currently           Current Outpatient Medications:   •  Apple Cider Vinegar 500 MG tablet, Take 1,500 mg by mouth Daily., Disp: , Rfl:   •  aspirin 81 MG EC tablet, Take 1 tablet by mouth Daily., Disp: 90 tablet, Rfl: 3  •  cetirizine (zyrTEC) 10 MG tablet, Take 10 mg by mouth Daily., Disp: , Rfl:   •  citalopram (CeleXA) 40 MG tablet, Take 40 mg by mouth Daily., Disp: , Rfl:   •  cyclobenzaprine (FLEXERIL) 10 MG tablet, Take 10 mg by mouth Daily., Disp: , Rfl:   •  esomeprazole (nexIUM) 40 MG capsule, Take 40 mg by mouth Every Morning Before Breakfast., Disp: , Rfl:   •  ferrous sulfate 325 (65 FE) MG tablet, Take 325 mg by mouth Daily With Breakfast., Disp: , Rfl:   •  montelukast (SINGULAIR) 10 MG tablet, Take 10 mg by mouth Every Night., Disp: , Rfl:   •  rosuvastatin (CRESTOR) 20 MG tablet, Take 1 tablet by mouth Daily., Disp: 90 tablet, Rfl: 3  •  cholecalciferol (VITAMIN D3) 25 MCG (1000 UT) tablet, Take 1,000 Units by mouth Daily., Disp: , Rfl:   •  rivaroxaban (XARELTO) 2.5 MG tablet, Take 1 tablet by mouth Daily With Dinner., Disp: 90 tablet, Rfl: 3      Allergies   Allergen Reactions   • Vicodin [Hydrocodone-Acetaminophen] Hives   • Atorvastatin Other (See Comments)     Transaminitis         /63 (BP Location: Left arm, Patient Position: Sitting, Cuff Size: Adult)   Pulse  "93   Temp 95.1 °F (35.1 °C)   Ht 170.2 cm (67\")   Wt 93.6 kg (206 lb 6.4 oz)   SpO2 95%   BMI 32.33 kg/m²       Physical Exam:  Constitutional: Patient appears well-developed and well-nourished.   HEENT: Head: Normocephalic and atraumatic.   Eyes: Conjunctivae and lids are normal.   Pupils: Equal, round, reactive to light.   Neck: Trachea normal. Neck supple. No JVD present.   Pulmonary Respiratory effort: No increased work of breathing or signs of respiratory distress. Auscultation of lungs: Clear to auscultation.   Cardiovascular Auscultation of heart: Normal rate and rhythm, normal S1 and S2, no murmurs.   Peripheral vascular exam: Femoral: right 1+, left 1+. Posterior tibialis: right trace and left trace. Dorsalis pedis: right trace, and left trace. Delayed CRT.  Musculoskeletal   Gait and station: Gait evaluation demonstrated an antalgic gait  Lumbar spine: Passive and active range of motion are limited secondary to pain. Extension, lateral flexion, rotation of the lumbar spine increased and reproduced some pain. Lumbar facet joint loading maneuvers are positive.  Isaías test and Gaenslen's test are negative   Piriformis maneuvers are negative   Palpation of the bilateral greater trochanter, reveals tenderness on the right   Examination of the Iliotibial band: unrevealing   Hip joints: The range of motion of the hip joints is slightly decreased and without pain on the left, the right hip joint is limited to internal and external flexion, due to pain.  Neurological:   Patient is alert and oriented to person, place, and time.   Speech: speech is normal.   Cortical function: Normal mental status.   Cranial nerves: Cranial nerves 2-12 intact.   Reflex Scores:  Right patellar: 2+  Left patellar: 2+  Right Achilles: 0+  Left Achilles: 0+  Motor strength: 5/5  Motor Tone: normal tone.   Involuntary movements: none.   Superficial/Primitive Reflexes: primitive reflexes were absent.   Right Zavaleta: absent  Left " Zavaleta: absent  Right ankle clonus: absent  Left ankle clonus: absent   Babinsky: absent  Negative long tract signs. Straight leg raising test is negative.   Sensory exam: intact to light touch, intact pain and temperature sensation, intact vibration sensation and normal proprioception.   Coordination: Normal finger to nose and heel to shin. Normal balance and negative Romberg's sign   Skin and subcutaneous tissue: Skin is warm and intact. No rash noted. No cyanosis.   Psychiatric: Judgment and insight: Normal. Orientation to person, place and time: Normal. Recent and remote memory: Intact. Mood and affect: Normal.     ASSESSMENT:   1. Spondylosis of lumbar region without myelopathy or radiculopathy    2. Degeneration of lumbar or lumbosacral intervertebral disc    3. Lumbar stenosis with neurogenic claudication    4. PAD (peripheral artery disease) (CMS/[facility])    5. Osteoporosis, unspecified osteoporosis type, unspecified pathological fracture presence    6. Diabetic polyneuropathy associated with type 2 diabetes mellitus (CMS/[facility])    7. Morbid obesity (CMS/[facility])    8. Current every day smoker        PLAN/MEDICAL DECISION MAKING: Patient reports a 30-year history of lower back pain.  MRI of the lumbar spine from January 2020 revealed exaggerated lordosis, multilevel lumbar facet arthropathy. Moderate Lumbar stenosis at L4-L5.  In addition, patient presents with a significant history of peripheral arterial disease with vascular claudication, and reports she sees a vascular surgeon in [place], from which she tells me she was getting a procedure to help with her leg edema, and blood flow.  I am unsure of which procedure she was talking about, she reports this was performed in the office.  She has since been evaluated by a new cardiologist who will take over her management. Unfortunately, around the end of February 2021, she suffered a traumatic fall fracturing her femur and shattering her hip.  She had to undergo hip  trochanteric nailing with additional surgical correction of her fractured femur by Dr. Mcneal in [place].  She reports it has been difficult to quantify or determine continued pain of her lower back due to the intensity and severity of her right hip pain.  At this point, she would like to follow-up on as-needed basis, as she still feels she is recovering from her traumatic fall and right hip surgery.  I informed her I will be happy to evaluate her in regards to her lower back pain, in the future when she is ready.  Patient has failed to obtain pain relief with conservative measures, as referenced under HPI.  Patient reports significant analgesic benefit from previous interventional pain management measures. I have reviewed all available patient's medical records as well as previous therapies as referenced above. I had a lengthy conversation with Ms. Orquidea Dodge regarding her chronic pain condition and potential therapeutic options including risks, benefits, alternative therapies, to name a few. Therefore, I have proposed the following plan:  1. Pharmacological measures: Reviewed. Discussed. Patient takes cyclobenzaprine.  Patient also takes Celexa. Patient has declined additional pharmacological measures   2. Interventional pain management measures: None indicated at this time.  Follow-up on as-needed basis per patient request.  Depending on patient's recurrent lower back pain we may consider one set of diagnostic bilateral lumbar medial branch blocks at L2, L3, L4; for bilateral lumbar facet joints at L3-L4, L4-L5, to clarify the origin of chronic refractory mechanical lower back pain. If patient experiences more than 80% relief along with significant improvement the range of motion of the lumbar spine, then, patient will be scheduled for bilateral lumbar medial branch rhizotomies. Other options would include the possibility of diagnostic and therapeutic bilateral L4-L5 transforaminal epidural steroid  injections.  In that case, patient will need to stop Xarelto for about 3 days prior to her procedure.  If she continues to struggle with pain, then, we may obtain lumbar myelogram followed by CT post myelogram and facilitate the referral for NSA consultation  3. Long-term rehabilitation efforts:  A. The patient has a history of falls. I did complete a risk assessment for falls. Fall precautions: Patient has been instructed regarding universal fall precautions, such as;   · Using gait aids such as a cane or a rolling walker at the appropriate height at all times for ambulation or wheelchair  · Removing all area rugs and coffee tables to create a safe environment at home  · Ensure clean, dry floors  · Wearing supportive footwear and properly fitting clothing  · Ensure bed/chair is appropriate height and patient's feet can touch the floor  · Using a shower transfer bench  · Using walk-in shower and having shower safety bars installed  · Ensure proper lighting, minimize glare  · Have nightlights operational and in use  · Participation in an exercise program for gait training, balance training and strength  · Avoid carrying laundry up and down steps  · Ensure proper compliance and organization of medications to avoid errors   · Avoid use of over the counter sedatives and alcohol consumption  · Ensure easy access to call bell, glasses, TV control, telephone  · Ensure glasses/hearing aids are in use or close by (on top of night table)  B. Patient will start a comprehensive physical therapy program at [facility] for core strengthening, gait and balance training, neurodynamics, ASTYM, myofascial release, cupping and dry needling if her lower back pain recurs  C. Start an exercise program such as yoga and water therapy  D. Contrast therapy: Apply ice-packs for 15-20 minutes, followed by heating pads for 15-20 minutes to affected area   E. Referral to [facility]. Patient counseled on the  importance of weight loss to help with overall health and pain control. Patient instructed to attempt weight loss.    F. Patient declined a referral to Dr. Sridhar Wing for cognitive behavioral therapy.  G. Patient has been screened for tobacco use: Current tobacco user. Smoking Cessation: I have advised the patient at length regarding the long-term deleterious effects of smoking and have provided the patient lifestyle modification strategies to facilitate smoking cessation.  She has expressed no willingness or interest in quitting at this time.  I spent approximately 2 minutes advising the patient.   4. The patient has been instructed to contact my office with any questions or difficulties. The patient understands the plan and agrees to proceed accordingly.        Patient Care Team:  Abby Jaime APRN as PCP - General (General Practice)  True Johnston IV, MD as Cardiologist (Interventional Cardiology)     No orders of the defined types were placed in this encounter.        Future Appointments   Date Time Provider Department Center   7/14/2021  9:30 AM Mari Jaramillo APRN [facility]   1/18/2022  1:40 PM Shruthi Patricia APRN [facility]         RADHA Crowder     EMR Dragon/Transcription disclaimer:  Much of this encounter note is an electronic transcription of spoken language to printed text. Electronic transcription of spoken language may permit erroneous, or at times, nonsensical words or phrases to be inadvertently transcribed. Although I have reviewed the note for such errors, some may still exist.

## 2021-07-13 ENCOUNTER — OFFICE VISIT (OUTPATIENT)
Dept: CARDIOLOGY | Facility: CLINIC | Age: 71
End: 2021-07-13

## 2021-07-13 VITALS
HEART RATE: 74 BPM | OXYGEN SATURATION: 98 % | BODY MASS INDEX: 32.49 KG/M2 | HEIGHT: 67 IN | SYSTOLIC BLOOD PRESSURE: 136 MMHG | DIASTOLIC BLOOD PRESSURE: 76 MMHG | WEIGHT: 207 LBS

## 2021-07-13 DIAGNOSIS — E78.5 HYPERLIPIDEMIA LDL GOAL <70: ICD-10-CM

## 2021-07-13 DIAGNOSIS — I25.119 CORONARY ARTERY DISEASE INVOLVING NATIVE CORONARY ARTERY OF NATIVE HEART WITH ANGINA PECTORIS (HCC): Primary | ICD-10-CM

## 2021-07-13 DIAGNOSIS — I71.40 AAA (ABDOMINAL AORTIC ANEURYSM) WITHOUT RUPTURE (HCC): ICD-10-CM

## 2021-07-13 DIAGNOSIS — F17.200 CURRENT EVERY DAY SMOKER: ICD-10-CM

## 2021-07-13 DIAGNOSIS — I73.9 PAD (PERIPHERAL ARTERY DISEASE) (HCC): ICD-10-CM

## 2021-07-13 PROBLEM — R55 SYNCOPE AND COLLAPSE: Status: ACTIVE | Noted: 2021-07-13

## 2021-07-13 PROBLEM — Z71.6 TOBACCO ABUSE COUNSELING: Status: RESOLVED | Noted: 2020-03-14 | Resolved: 2021-07-13

## 2021-07-13 PROBLEM — E66.9 DIABETES MELLITUS TYPE 2 IN OBESE: Status: RESOLVED | Noted: 2020-03-14 | Resolved: 2021-07-13

## 2021-07-13 PROBLEM — E11.69 DIABETES MELLITUS TYPE 2 IN OBESE: Status: RESOLVED | Noted: 2020-03-14 | Resolved: 2021-07-13

## 2021-07-13 PROBLEM — R55 SYNCOPE AND COLLAPSE: Status: RESOLVED | Noted: 2021-07-13 | Resolved: 2021-07-13

## 2021-07-13 PROBLEM — E66.01 MORBID OBESITY: Status: RESOLVED | Noted: 2020-03-14 | Resolved: 2021-07-13

## 2021-07-13 PROCEDURE — 93000 ELECTROCARDIOGRAM COMPLETE: CPT | Performed by: INTERNAL MEDICINE

## 2021-07-13 PROCEDURE — 99204 OFFICE O/P NEW MOD 45 MIN: CPT | Performed by: INTERNAL MEDICINE

## 2021-07-13 RX ORDER — ASPIRIN 81 MG/1
81 TABLET ORAL DAILY
Qty: 90 TABLET | Refills: 3 | Status: SHIPPED | OUTPATIENT
Start: 2021-07-13

## 2021-07-13 RX ORDER — ROSUVASTATIN CALCIUM 20 MG/1
20 TABLET, COATED ORAL DAILY
Qty: 90 TABLET | Refills: 3 | Status: SHIPPED | OUTPATIENT
Start: 2021-07-13 | End: 2021-09-14 | Stop reason: SDUPTHER

## 2021-07-13 RX ORDER — ASPIRIN 81 MG/1
81 TABLET ORAL DAILY
COMMUNITY
End: 2021-07-13 | Stop reason: SDUPTHER

## 2021-07-13 RX ORDER — MONTELUKAST SODIUM 10 MG/1
10 TABLET ORAL NIGHTLY
COMMUNITY

## 2021-07-13 RX ORDER — TETRACYCLINE HCL 500 MG
1500 CAPSULE ORAL DAILY
COMMUNITY

## 2021-07-13 RX ORDER — MELATONIN
1000 DAILY
COMMUNITY
End: 2022-01-18

## 2021-07-13 RX ORDER — FERROUS SULFATE 325(65) MG
325 TABLET ORAL
COMMUNITY
End: 2022-01-18

## 2021-07-13 NOTE — ASSESSMENT & PLAN NOTE
· No claudication or nonhealing ulcers or rest pain to suggest critical limb ischemia  · Continue aspirin  · Change clopidogrel to rivaroxaban 2.5 mg daily  · Smoking cessation strongly recommended  · Start statin therapy

## 2021-07-13 NOTE — ASSESSMENT & PLAN NOTE
· No anginal symptoms  · Continue low-dose aspirin  · Initiate statin therapy  · Obtain cardiac catheterization films from [facility]

## 2021-07-14 ENCOUNTER — OFFICE VISIT (OUTPATIENT)
Dept: PAIN MEDICINE | Facility: CLINIC | Age: 71
End: 2021-07-14

## 2021-07-14 VITALS
WEIGHT: 206.4 LBS | TEMPERATURE: 95.1 F | SYSTOLIC BLOOD PRESSURE: 136 MMHG | OXYGEN SATURATION: 95 % | HEIGHT: 67 IN | DIASTOLIC BLOOD PRESSURE: 63 MMHG | BODY MASS INDEX: 32.39 KG/M2 | HEART RATE: 93 BPM

## 2021-07-14 DIAGNOSIS — M51.37 DEGENERATION OF LUMBAR OR LUMBOSACRAL INTERVERTEBRAL DISC: ICD-10-CM

## 2021-07-14 DIAGNOSIS — M47.816 SPONDYLOSIS OF LUMBAR REGION WITHOUT MYELOPATHY OR RADICULOPATHY: ICD-10-CM

## 2021-07-14 DIAGNOSIS — E66.01 MORBID OBESITY (HCC): ICD-10-CM

## 2021-07-14 DIAGNOSIS — F17.200 CURRENT EVERY DAY SMOKER: ICD-10-CM

## 2021-07-14 DIAGNOSIS — E11.42 DIABETIC POLYNEUROPATHY ASSOCIATED WITH TYPE 2 DIABETES MELLITUS (HCC): ICD-10-CM

## 2021-07-14 DIAGNOSIS — M81.0 OSTEOPOROSIS, UNSPECIFIED OSTEOPOROSIS TYPE, UNSPECIFIED PATHOLOGICAL FRACTURE PRESENCE: ICD-10-CM

## 2021-07-14 DIAGNOSIS — I73.9 PAD (PERIPHERAL ARTERY DISEASE) (HCC): ICD-10-CM

## 2021-07-14 DIAGNOSIS — M48.062 LUMBAR STENOSIS WITH NEUROGENIC CLAUDICATION: ICD-10-CM

## 2021-07-14 PROCEDURE — 99213 OFFICE O/P EST LOW 20 MIN: CPT | Performed by: NURSE PRACTITIONER

## 2021-07-15 ENCOUNTER — HOSPITAL ENCOUNTER (OUTPATIENT)
Dept: CARDIOLOGY | Facility: HOSPITAL | Age: 71
Discharge: HOME | End: 2021-07-15

## 2021-07-15 DIAGNOSIS — Z00.6 ENCOUNTER FOR EXAMINATION FOR NORMAL COMPARISON OR CONTROL IN CLINICAL RESEARCH PROGRAM: ICD-10-CM

## 2021-07-15 DIAGNOSIS — Z00.6 EXAMINATION FOR NORMAL COMPARISON OR CONTROL IN CLINICAL RESEARCH: ICD-10-CM

## 2021-07-15 DIAGNOSIS — I71.40 AAA (ABDOMINAL AORTIC ANEURYSM) WITHOUT RUPTURE (HCC): Primary | ICD-10-CM

## 2021-07-19 ENCOUNTER — TELEPHONE (OUTPATIENT)
Dept: CARDIOLOGY | Facility: CLINIC | Age: 71
End: 2021-07-19

## 2021-07-19 RX ORDER — CLOPIDOGREL BISULFATE 75 MG/1
75 TABLET ORAL DAILY
COMMUNITY

## 2021-07-19 NOTE — TELEPHONE ENCOUNTER
Pt called to report she cannot afford Xarelto (>$200/month). Pt has medicare. She would like to stay on Plavix. Will offer samples if HTR prefers to keep on Xarelto.    Ok to switch back?

## 2021-07-19 NOTE — TELEPHONE ENCOUNTER
Stay on Plavix.    JAYLON Johnston MD FACC, [facility]  Interventional and General Cardiology

## 2021-07-22 ENCOUNTER — HOSPITAL ENCOUNTER (OUTPATIENT)
Dept: ULTRASOUND IMAGING | Facility: HOSPITAL | Age: 71
Discharge: HOME | End: 2021-07-22
Admitting: INTERNAL MEDICINE

## 2021-07-22 DIAGNOSIS — I71.40 AAA (ABDOMINAL AORTIC ANEURYSM) WITHOUT RUPTURE (HCC): ICD-10-CM

## 2021-07-22 PROCEDURE — 76706 US ABDL AORTA SCREEN AAA: CPT

## 2021-07-23 ENCOUNTER — TELEPHONE (OUTPATIENT)
Dept: CARDIOLOGY | Facility: CLINIC | Age: 71
End: 2021-07-23

## 2021-07-23 NOTE — TELEPHONE ENCOUNTER
----- Message from True Johnston IV, MD sent at 7/22/2021  5:21 PM EDT -----  Let her know this is normal

## 2021-09-14 DIAGNOSIS — E78.5 HYPERLIPIDEMIA LDL GOAL <70: ICD-10-CM

## 2021-09-14 RX ORDER — ROSUVASTATIN CALCIUM 20 MG/1
20 TABLET, COATED ORAL DAILY
Qty: 90 TABLET | Refills: 0 | Status: SHIPPED | OUTPATIENT
Start: 2021-09-14 | End: 2022-01-18 | Stop reason: SDUPTHER

## 2022-01-17 NOTE — PROGRESS NOTES
"[facility] Cardiology      Identification: Orquidea Dodge is a 71 y.o. female who resides in [place], [place]    Reason for visit:  Hyperlipidemia and Coronary artery disease involving native coronary artery of       Subjective      Orquidea Dodge presents to [facility] for followup.    History of Present Illness    Patient is a 71-year-old female who returns today for follow-up of her coronary artery disease, and cardiac risk factors. The patient was seen in July to establish care but no prior records were available.  We have since obtained the records from [facility]/Dr. Neal's office.  The patient has a history of PCI back in 2019 in 2020.  She reports her symptoms at that time for left shoulder and neck discomfort.  She has not had the symptoms since then.  She does experience occasional chest discomfort that is nonexertional but not reminiscent of her prior symptoms.  She also reports shortness of breath with moderate activities.  She is a longtime 1 pack/day smoker.  She reports having a history of PVD and reports having some procedure to her left calf at [facility] but we do not have any reports of that.  At her last visit she also reported having an abdominal aortic aneurysm.  We scheduled for her an abdominal ultrasound of her aorta and no aneurysm was identified.  She denies any claudication symptoms.  Blood pressures are controlled despite being on no antihypertensive medication.  In the past RADHA Bolaños prescribed her a blood pressure medication and it \"bottomed\" her out.    Objective     /72 (BP Location: Left arm, Patient Position: Sitting, Cuff Size: Adult)   Pulse 85   Ht 170.2 cm (67\")   Wt 98.9 kg (218 lb)   SpO2 96%   BMI 34.14 kg/m²       Constitutional:       Appearance: Healthy appearance. Well-developed.   Eyes:      General: Lids are normal. No scleral icterus.     Conjunctiva/sclera: Conjunctivae normal.   HENT:      " Head: Normocephalic and atraumatic.   Neck:      Thyroid: No thyromegaly.      Vascular: No carotid bruit or JVD.   Pulmonary:      Effort: Pulmonary effort is normal.      Breath sounds: Normal breath sounds. No wheezing. No rhonchi. No rales.   Cardiovascular:      Normal rate. Regular rhythm.      Murmurs: There is no murmur.      No gallop. No rub.   Pulses:     Intact distal pulses.   Edema:     Peripheral edema absent.   Abdominal:      General: There is no distension.      Palpations: Abdomen is soft. There is no abdominal mass.   Musculoskeletal:      Cervical back: Normal range of motion. Skin:     General: Skin is warm and dry.      Findings: No rash.   Neurological:      General: No focal deficit present.      Mental Status: Alert and oriented to person, place, and time.      Gait: Gait is intact.   Psychiatric:         Attention and Perception: Attention normal.         Mood and Affect: Mood normal.         Behavior: Behavior normal.         Result Review :    No results found for: GLUCOSE, BUN, CREATININE, EGFRIFNONA, EGFRIFAFRI, BCR, K, CO2, CALCIUM, PROTENTOTREF, ALBUMIN, LABIL2, BILIRUBIN, AST, ALT  No results found for: WBC, HGB, HCT, MCV, PLT  No results found for: CHOL, CHLPL, TRIG, HDL, LDL, LDLDIRECT  No results found for: HGBA1C          Assessment     Problem List Items Addressed This Visit        Cardiac and Vasculature    AAA (abdominal aortic aneurysm) without rupture (HCC)    Overview     · Abdominal ultrasound (7/2021): No abdominal aortic aneurysm noted         Current Assessment & Plan     · No abdominal aneurysm noted on ultrasound in July         Coronary artery disease involving native coronary artery of native heart with angina pectoris (HCC) - Primary (Chronic)    Overview     · Cardiac catheterization for abnormal stress (7/30/2019): Patent stents to circumflex.  PCI to first diagonal.  PCI to mid RCA  · Cardiac catheterization (7/31/2020): Borderline significant LAD/diagonal  bifurcation disease status post acceptable FFR (FFR = 0.87 in diagonal, 0.84 in LAD)         Current Assessment & Plan     · No signs or symptoms of angina  · Continue aspirin 81 mg daily  · Continue Plavix 75 mg daily         Dyspnea on exertion    Current Assessment & Plan     · Patient needs to quit smoking.  If her breathing does not improve with smoking cessation we will order echocardiogram         Hyperlipidemia LDL goal <70 (Chronic)    Overview     • High intensity statin therapy indicated given the presence of CAD  • History of transaminitis with atorvastatin         Current Assessment & Plan     · Continue Crestor 20 mg daily         Relevant Medications    rosuvastatin (CRESTOR) 20 MG tablet    Other Relevant Orders    Lipid Panel    Comprehensive Metabolic Panel       Tobacco    Current every day smoker    Current Assessment & Plan     · Recommend immediate smoking cessation           Other Visit Diagnoses     Healthcare maintenance        Relevant Orders    Hemoglobin A1c      The patient is doing well from a cardiovascular standpoint.  She does get short of breath which is likely related to her longtime smoking.  I have explained to her the importance of smoking cessation and the correlation between it and further progression of heart disease.  If her breathing does not improve with smoking cessation or it progresses and worsen she can contact us and we will get an echocardiogram.  I will send her for a lipid profile, CMP and hemoglobin A1c today.  She does report in the past she was diabetic but is no longer on medications.  We will continue her current medication she will follow-up 6 months or sooner if needed.    Plan   • Recommend immediate smoking cessation  • If breathing progresses and worsens will order echocardiogram  • CMP, lipid profile and hemoglobin A1c today      Follow-up   Return in about 6 months (around 7/18/2022), or if symptoms worsen or fail to improve, for Follow-up with   [facility] next visit.        Shruthi Patricia, APRN  1/18/2022

## 2022-01-18 ENCOUNTER — LAB (OUTPATIENT)
Dept: LAB | Facility: HOSPITAL | Age: 72
End: 2022-01-18

## 2022-01-18 ENCOUNTER — OFFICE VISIT (OUTPATIENT)
Dept: CARDIOLOGY | Facility: CLINIC | Age: 72
End: 2022-01-18

## 2022-01-18 VITALS
DIASTOLIC BLOOD PRESSURE: 72 MMHG | OXYGEN SATURATION: 96 % | WEIGHT: 218 LBS | BODY MASS INDEX: 34.21 KG/M2 | SYSTOLIC BLOOD PRESSURE: 128 MMHG | HEIGHT: 67 IN | HEART RATE: 85 BPM

## 2022-01-18 DIAGNOSIS — E78.5 HYPERLIPIDEMIA LDL GOAL <70: Chronic | ICD-10-CM

## 2022-01-18 DIAGNOSIS — Z00.00 HEALTHCARE MAINTENANCE: ICD-10-CM

## 2022-01-18 DIAGNOSIS — R06.09 DYSPNEA ON EXERTION: ICD-10-CM

## 2022-01-18 DIAGNOSIS — I71.40 AAA (ABDOMINAL AORTIC ANEURYSM) WITHOUT RUPTURE: ICD-10-CM

## 2022-01-18 DIAGNOSIS — F17.200 CURRENT EVERY DAY SMOKER: ICD-10-CM

## 2022-01-18 DIAGNOSIS — I25.119 CORONARY ARTERY DISEASE INVOLVING NATIVE CORONARY ARTERY OF NATIVE HEART WITH ANGINA PECTORIS: Primary | Chronic | ICD-10-CM

## 2022-01-18 LAB
ALBUMIN SERPL-MCNC: 3.6 G/DL (ref 3.5–5.2)
ALBUMIN/GLOB SERPL: 1.2 G/DL
ALP SERPL-CCNC: 126 U/L (ref 39–117)
ALT SERPL W P-5'-P-CCNC: 8 U/L (ref 1–33)
ANION GAP SERPL CALCULATED.3IONS-SCNC: 8.8 MMOL/L (ref 5–15)
AST SERPL-CCNC: 24 U/L (ref 1–32)
BILIRUB SERPL-MCNC: 0.2 MG/DL (ref 0–1.2)
BUN SERPL-MCNC: 6 MG/DL (ref 8–23)
BUN/CREAT SERPL: 5.1 (ref 7–25)
CALCIUM SPEC-SCNC: 9.2 MG/DL (ref 8.6–10.5)
CHLORIDE SERPL-SCNC: 99 MMOL/L (ref 98–107)
CHOLEST SERPL-MCNC: 112 MG/DL (ref 0–200)
CO2 SERPL-SCNC: 32.2 MMOL/L (ref 22–29)
CREAT SERPL-MCNC: 1.18 MG/DL (ref 0.57–1)
GFR SERPL CREATININE-BSD FRML MDRD: 45 ML/MIN/1.73
GLOBULIN UR ELPH-MCNC: 3.1 GM/DL
GLUCOSE SERPL-MCNC: 105 MG/DL (ref 65–99)
HBA1C MFR BLD: 7.65 % (ref 4.8–5.6)
HDLC SERPL-MCNC: 35 MG/DL (ref 40–60)
LDLC SERPL CALC-MCNC: 51 MG/DL (ref 0–100)
LDLC/HDLC SERPL: 1.34 {RATIO}
POTASSIUM SERPL-SCNC: 3.2 MMOL/L (ref 3.5–5.2)
PROT SERPL-MCNC: 6.7 G/DL (ref 6–8.5)
SODIUM SERPL-SCNC: 140 MMOL/L (ref 136–145)
TRIGL SERPL-MCNC: 151 MG/DL (ref 0–150)
VLDLC SERPL-MCNC: 26 MG/DL (ref 5–40)

## 2022-01-18 PROCEDURE — 83036 HEMOGLOBIN GLYCOSYLATED A1C: CPT

## 2022-01-18 PROCEDURE — 36415 COLL VENOUS BLD VENIPUNCTURE: CPT

## 2022-01-18 PROCEDURE — 80053 COMPREHEN METABOLIC PANEL: CPT

## 2022-01-18 PROCEDURE — 80061 LIPID PANEL: CPT

## 2022-01-18 PROCEDURE — 99214 OFFICE O/P EST MOD 30 MIN: CPT | Performed by: NURSE PRACTITIONER

## 2022-01-18 RX ORDER — ROSUVASTATIN CALCIUM 20 MG/1
20 TABLET, COATED ORAL DAILY
Qty: 90 TABLET | Refills: 3 | Status: SHIPPED | OUTPATIENT
Start: 2022-01-18

## 2022-01-18 RX ORDER — BIOTIN 10000 MCG
CAPSULE ORAL DAILY
COMMUNITY

## 2022-01-18 NOTE — ASSESSMENT & PLAN NOTE
· Patient needs to quit smoking.  If her breathing does not improve with smoking cessation we will order echocardiogram

## 2022-01-19 ENCOUNTER — TELEPHONE (OUTPATIENT)
Dept: CARDIOLOGY | Facility: CLINIC | Age: 72
End: 2022-01-19

## 2022-01-19 DIAGNOSIS — Z79.899 LONG-TERM USE OF HIGH-RISK MEDICATION: Primary | ICD-10-CM

## 2022-01-19 NOTE — TELEPHONE ENCOUNTER
Per Janny Patricia, RADHA- Let her know her hemoglobin A1c in regards to diabetes is elevated at 7.65.  She is not on any diabetes medications.  She used to be in the past but says she was no longer diabetic when I saw her in clinic.  I would like for her to start Jardiance 10 mg daily.  Her potassium was also a little low but we will hold off on supplemental potassium for now and recheck a BMP in 1 to 2 weeks after started Jardiance.  If potassium stays low we will start her on low-dose potassium at that time

## 2022-03-14 PROBLEM — E11.9 TYPE 2 DIABETES MELLITUS (HCC): Status: ACTIVE | Noted: 2020-03-14

## 2022-03-14 PROBLEM — E11.9 TYPE 2 DIABETES MELLITUS (HCC): Chronic | Status: ACTIVE | Noted: 2020-03-14

## 2022-03-14 PROBLEM — R06.09 DYSPNEA ON EXERTION: Status: RESOLVED | Noted: 2022-01-18 | Resolved: 2022-03-14

## 2022-04-25 RX ORDER — EMPAGLIFLOZIN 10 MG/1
TABLET, FILM COATED ORAL
Qty: 90 TABLET | Refills: 0 | Status: SHIPPED | OUTPATIENT
Start: 2022-04-25

## 2022-06-20 ENCOUNTER — HOSPITAL ENCOUNTER (OUTPATIENT)
Dept: HOSPITAL 79 - LAB | Age: 72
End: 2022-06-20
Payer: COMMERCIAL

## 2022-06-20 DIAGNOSIS — I10: Primary | ICD-10-CM

## 2022-06-20 DIAGNOSIS — E87.6: ICD-10-CM

## 2022-06-20 LAB
BUN/CREATININE RATIO: 6 (ref 0–10)
HGB BLD-MCNC: 9.4 GM/DL (ref 12.3–15.3)
RED BLOOD COUNT: 3.98 M/UL (ref 4–5.1)
WHITE BLOOD COUNT: 12.4 K/UL (ref 4.5–11)

## 2022-07-20 ENCOUNTER — OFFICE VISIT (OUTPATIENT)
Dept: PAIN MEDICINE | Facility: CLINIC | Age: 72
End: 2022-07-20

## 2022-07-20 ENCOUNTER — TELEPHONE (OUTPATIENT)
Dept: PAIN MEDICINE | Facility: CLINIC | Age: 72
End: 2022-07-20

## 2022-07-20 ENCOUNTER — TELEPHONE (OUTPATIENT)
Dept: CARDIOLOGY | Facility: CLINIC | Age: 72
End: 2022-07-20

## 2022-07-20 VITALS
HEART RATE: 96 BPM | SYSTOLIC BLOOD PRESSURE: 120 MMHG | DIASTOLIC BLOOD PRESSURE: 80 MMHG | HEIGHT: 67 IN | BODY MASS INDEX: 34.21 KG/M2 | WEIGHT: 218 LBS | RESPIRATION RATE: 18 BRPM | OXYGEN SATURATION: 99 %

## 2022-07-20 DIAGNOSIS — E11.42 DIABETIC POLYNEUROPATHY ASSOCIATED WITH TYPE 2 DIABETES MELLITUS: ICD-10-CM

## 2022-07-20 DIAGNOSIS — M48.062 LUMBAR STENOSIS WITH NEUROGENIC CLAUDICATION: ICD-10-CM

## 2022-07-20 DIAGNOSIS — E66.01 MORBID OBESITY: ICD-10-CM

## 2022-07-20 DIAGNOSIS — G47.33 OSA (OBSTRUCTIVE SLEEP APNEA): ICD-10-CM

## 2022-07-20 DIAGNOSIS — F17.200 CURRENT EVERY DAY SMOKER: ICD-10-CM

## 2022-07-20 DIAGNOSIS — I73.9 PAD (PERIPHERAL ARTERY DISEASE): ICD-10-CM

## 2022-07-20 DIAGNOSIS — M48.062 LUMBAR STENOSIS WITH NEUROGENIC CLAUDICATION: Primary | ICD-10-CM

## 2022-07-20 DIAGNOSIS — M47.816 SPONDYLOSIS OF LUMBAR REGION WITHOUT MYELOPATHY OR RADICULOPATHY: ICD-10-CM

## 2022-07-20 DIAGNOSIS — M51.37 DEGENERATION OF LUMBAR OR LUMBOSACRAL INTERVERTEBRAL DISC: ICD-10-CM

## 2022-07-20 PROCEDURE — 99213 OFFICE O/P EST LOW 20 MIN: CPT | Performed by: NURSE PRACTITIONER

## 2022-07-20 RX ORDER — GLIMEPIRIDE 2 MG/1
2 TABLET ORAL
COMMUNITY

## 2022-07-20 NOTE — TELEPHONE ENCOUNTER
Pain Management is requesting cardiac clearance to proceed with bilateral L4-L5 transforaminal epidural steroid injections. Asking to hold Plavix 7 days. OK to clear for this, hold Plavix 5 days and continue ASA?

## 2022-08-10 ENCOUNTER — TRANSCRIBE ORDERS (OUTPATIENT)
Dept: ADMINISTRATIVE | Facility: HOSPITAL | Age: 72
End: 2022-08-10

## 2022-08-10 DIAGNOSIS — N18.32 CHRONIC KIDNEY DISEASE (CKD) STAGE G3B/A1, MODERATELY DECREASED GLOMERULAR FILTRATION RATE (GFR) BETWEEN 30-44 ML/MIN/1.73 SQUARE METER AND ALBUMINURIA CREATININE RATIO LESS THAN 30 MG/G (CMS/H*: Primary | ICD-10-CM

## 2022-09-06 ENCOUNTER — HOSPITAL ENCOUNTER (OUTPATIENT)
Dept: ULTRASOUND IMAGING | Facility: HOSPITAL | Age: 72
Discharge: HOME | End: 2022-09-06

## 2022-09-06 ENCOUNTER — TRANSCRIBE ORDERS (OUTPATIENT)
Dept: ADMINISTRATIVE | Facility: HOSPITAL | Age: 72
End: 2022-09-06

## 2022-09-06 ENCOUNTER — LAB (OUTPATIENT)
Dept: LAB | Facility: HOSPITAL | Age: 72
End: 2022-09-06

## 2022-09-06 DIAGNOSIS — N18.32 STAGE 3B CHRONIC KIDNEY DISEASE: ICD-10-CM

## 2022-09-06 DIAGNOSIS — D64.9 ANEMIA, UNSPECIFIED TYPE: ICD-10-CM

## 2022-09-06 DIAGNOSIS — N18.32 STAGE 3B CHRONIC KIDNEY DISEASE: Primary | ICD-10-CM

## 2022-09-06 DIAGNOSIS — N18.32 CHRONIC KIDNEY DISEASE (CKD) STAGE G3B/A1, MODERATELY DECREASED GLOMERULAR FILTRATION RATE (GFR) BETWEEN 30-44 ML/MIN/1.73 SQUARE METER AND ALBUMINURIA CREATININE RATIO LESS THAN 30 MG/G (CMS/H*: ICD-10-CM

## 2022-09-06 PROCEDURE — 85007 BL SMEAR W/DIFF WBC COUNT: CPT

## 2022-09-06 PROCEDURE — 36415 COLL VENOUS BLD VENIPUNCTURE: CPT

## 2022-09-06 PROCEDURE — 85025 COMPLETE CBC W/AUTO DIFF WBC: CPT

## 2022-09-06 PROCEDURE — 80048 BASIC METABOLIC PNL TOTAL CA: CPT

## 2022-09-06 PROCEDURE — 83540 ASSAY OF IRON: CPT

## 2022-09-06 PROCEDURE — 82728 ASSAY OF FERRITIN: CPT

## 2022-09-06 PROCEDURE — 84466 ASSAY OF TRANSFERRIN: CPT

## 2022-09-07 LAB
ANION GAP SERPL CALCULATED.3IONS-SCNC: 11.9 MMOL/L (ref 5–15)
ANISOCYTOSIS BLD QL: ABNORMAL
BUN SERPL-MCNC: 7 MG/DL (ref 8–23)
BUN/CREAT SERPL: 6.6 (ref 7–25)
BURR CELLS BLD QL SMEAR: ABNORMAL
CALCIUM SPEC-SCNC: 8.5 MG/DL (ref 8.6–10.5)
CHLORIDE SERPL-SCNC: 101 MMOL/L (ref 98–107)
CO2 SERPL-SCNC: 25.1 MMOL/L (ref 22–29)
CREAT SERPL-MCNC: 1.06 MG/DL (ref 0.57–1)
DEPRECATED RDW RBC AUTO: 40 FL (ref 37–54)
EGFRCR SERPLBLD CKD-EPI 2021: 56.6 ML/MIN/1.73
EOSINOPHIL # BLD MANUAL: 0.52 10*3/MM3 (ref 0–0.4)
EOSINOPHIL NFR BLD MANUAL: 4 % (ref 0.3–6.2)
ERYTHROCYTE [DISTWIDTH] IN BLOOD BY AUTOMATED COUNT: 15.6 % (ref 12.3–15.4)
FERRITIN SERPL-MCNC: 11.8 NG/ML (ref 13–150)
GLUCOSE SERPL-MCNC: 181 MG/DL (ref 65–99)
HCT VFR BLD AUTO: 27 % (ref 34–46.6)
HGB BLD-MCNC: 8.1 G/DL (ref 12–15.9)
IRON 24H UR-MRATE: 16 MCG/DL (ref 37–145)
IRON SATN MFR SERPL: 4 % (ref 20–50)
LYMPHOCYTES # BLD MANUAL: 2.34 10*3/MM3 (ref 0.7–3.1)
LYMPHOCYTES NFR BLD MANUAL: 4 % (ref 5–12)
MCH RBC QN AUTO: 21.7 PG (ref 26.6–33)
MCHC RBC AUTO-ENTMCNC: 30 G/DL (ref 31.5–35.7)
MCV RBC AUTO: 72.2 FL (ref 79–97)
MICROCYTES BLD QL: ABNORMAL
MONOCYTES # BLD: 0.52 10*3/MM3 (ref 0.1–0.9)
NEUTROPHILS # BLD AUTO: 9.49 10*3/MM3 (ref 1.7–7)
NEUTROPHILS NFR BLD MANUAL: 73.7 % (ref 42.7–76)
NRBC BLD AUTO-RTO: 0 /100 WBC (ref 0–0.2)
PLAT MORPH BLD: NORMAL
PLATELET # BLD AUTO: 404 10*3/MM3 (ref 140–450)
PMV BLD AUTO: 8.8 FL (ref 6–12)
POIKILOCYTOSIS BLD QL SMEAR: ABNORMAL
POLYCHROMASIA BLD QL SMEAR: ABNORMAL
POTASSIUM SERPL-SCNC: 2.8 MMOL/L (ref 3.5–5.2)
RBC # BLD AUTO: 3.74 10*6/MM3 (ref 3.77–5.28)
SODIUM SERPL-SCNC: 138 MMOL/L (ref 136–145)
TIBC SERPL-MCNC: 411 MCG/DL (ref 298–536)
TRANSFERRIN SERPL-MCNC: 276 MG/DL (ref 200–360)
VARIANT LYMPHS NFR BLD MANUAL: 18.2 % (ref 19.6–45.3)
WBC MORPH BLD: NORMAL
WBC NRBC COR # BLD: 12.88 10*3/MM3 (ref 3.4–10.8)

## 2022-09-15 ENCOUNTER — LAB (OUTPATIENT)
Dept: LAB | Facility: HOSPITAL | Age: 72
End: 2022-09-15

## 2022-09-15 ENCOUNTER — HOSPITAL ENCOUNTER (OUTPATIENT)
Dept: ULTRASOUND IMAGING | Facility: HOSPITAL | Age: 72
Discharge: HOME | End: 2022-09-15

## 2022-09-15 DIAGNOSIS — N18.32 STAGE 3B CHRONIC KIDNEY DISEASE: ICD-10-CM

## 2022-09-15 DIAGNOSIS — D64.9 ANEMIA, UNSPECIFIED TYPE: ICD-10-CM

## 2022-09-15 LAB
ALBUMIN UR-MCNC: <1.2 MG/DL
BACTERIA UR QL AUTO: ABNORMAL /HPF
BILIRUB UR QL STRIP: NEGATIVE
CLARITY UR: ABNORMAL
COLOR UR: YELLOW
CREAT UR-MCNC: 31.1 MG/DL
CREAT UR-MCNC: 31.1 MG/DL
GLUCOSE UR STRIP-MCNC: NEGATIVE MG/DL
HGB UR QL STRIP.AUTO: ABNORMAL
HYALINE CASTS UR QL AUTO: ABNORMAL /LPF
KETONES UR QL STRIP: NEGATIVE
LEUKOCYTE ESTERASE UR QL STRIP.AUTO: ABNORMAL
MICROALBUMIN/CREAT UR: NORMAL MG/G{CREAT}
NITRITE UR QL STRIP: NEGATIVE
PH UR STRIP.AUTO: 6.5 [PH] (ref 5–8)
PROT ?TM UR-MCNC: 9.1 MG/DL
PROT UR QL STRIP: NEGATIVE
PROT/CREAT UR: 292.6 MG/G CREA (ref 0–200)
RBC # UR STRIP: ABNORMAL /HPF
REF LAB TEST METHOD: ABNORMAL
SP GR UR STRIP: 1.01 (ref 1–1.03)
SQUAMOUS #/AREA URNS HPF: ABNORMAL /HPF
UROBILINOGEN UR QL STRIP: ABNORMAL
WBC # UR STRIP: ABNORMAL /HPF

## 2022-09-15 PROCEDURE — 82043 UR ALBUMIN QUANTITATIVE: CPT

## 2022-09-15 PROCEDURE — 81001 URINALYSIS AUTO W/SCOPE: CPT

## 2022-09-15 PROCEDURE — 76775 US EXAM ABDO BACK WALL LIM: CPT | Performed by: RADIOLOGY

## 2022-09-15 PROCEDURE — 84156 ASSAY OF PROTEIN URINE: CPT

## 2022-09-15 PROCEDURE — 82570 ASSAY OF URINE CREATININE: CPT

## 2022-09-15 PROCEDURE — 76775 US EXAM ABDO BACK WALL LIM: CPT

## 2022-09-26 ENCOUNTER — TRANSCRIBE ORDERS (OUTPATIENT)
Dept: ADMINISTRATIVE | Facility: HOSPITAL | Age: 72
End: 2022-09-26

## 2022-09-26 ENCOUNTER — LAB (OUTPATIENT)
Dept: LAB | Facility: HOSPITAL | Age: 72
End: 2022-09-26

## 2022-09-26 DIAGNOSIS — E87.6 HYPOKALEMIA: Primary | ICD-10-CM

## 2022-09-26 DIAGNOSIS — E87.6 HYPOKALEMIA: ICD-10-CM

## 2022-09-26 LAB
ANION GAP SERPL CALCULATED.3IONS-SCNC: 12 MMOL/L (ref 5–15)
BUN SERPL-MCNC: 8 MG/DL (ref 8–23)
BUN/CREAT SERPL: 6.4 (ref 7–25)
CALCIUM SPEC-SCNC: 9.2 MG/DL (ref 8.6–10.5)
CHLORIDE SERPL-SCNC: 99 MMOL/L (ref 98–107)
CO2 SERPL-SCNC: 25 MMOL/L (ref 22–29)
CREAT SERPL-MCNC: 1.25 MG/DL (ref 0.57–1)
EGFRCR SERPLBLD CKD-EPI 2021: 46.5 ML/MIN/1.73
GLUCOSE SERPL-MCNC: 169 MG/DL (ref 65–99)
POTASSIUM SERPL-SCNC: 4.2 MMOL/L (ref 3.5–5.2)
SODIUM SERPL-SCNC: 136 MMOL/L (ref 136–145)

## 2022-09-26 PROCEDURE — 36415 COLL VENOUS BLD VENIPUNCTURE: CPT

## 2022-09-26 PROCEDURE — 84244 ASSAY OF RENIN: CPT

## 2022-09-26 PROCEDURE — 82088 ASSAY OF ALDOSTERONE: CPT

## 2022-09-26 PROCEDURE — 80048 BASIC METABOLIC PNL TOTAL CA: CPT

## 2022-09-29 LAB
ALDOST SERPL-MCNC: 2.4 NG/DL (ref 0–30)
RENIN PLAS-CCNC: 0.41 NG/ML/HR (ref 0.17–5.38)

## 2022-10-24 NOTE — TELEPHONE ENCOUNTER
Patient was made aware of Plavix hold for 7 days prior but that she must remain on her aspirin.  Pt agreed and had no questions or concerns.   Ftsg Text: The defect edges were debeveled with a #15 scalpel blade.  Given the location of the defect, shape of the defect and the proximity to free margins a full thickness skin graft was deemed most appropriate.  Using a sterile surgical marker, the primary defect shape was transferred to the donor site. The area thus outlined was incised deep to adipose tissue with a #15 scalpel blade.  The harvested graft was then trimmed of adipose tissue until only dermis and epidermis was left.  The skin margins of the secondary defect were undermined to an appropriate distance in all directions utilizing iris scissors.  The secondary defect was closed with interrupted buried subcutaneous sutures.  The skin edges were then re-apposed with running  sutures.  The skin graft was then placed in the primary defect and oriented appropriately.

## 2023-08-04 ENCOUNTER — TELEPHONE (OUTPATIENT)
Dept: CARDIOLOGY | Facility: CLINIC | Age: 73
End: 2023-08-04
Payer: MEDICARE

## 2023-08-07 PROBLEM — I71.40 AAA (ABDOMINAL AORTIC ANEURYSM) WITHOUT RUPTURE: Status: RESOLVED | Noted: 2021-07-13 | Resolved: 2023-08-07

## 2023-08-07 NOTE — PROGRESS NOTES
"    Cardiology Outpatient Visit      Identification: Orquidea Dodge is a 73 y.o. female who resides in [place], [place]    Reason for visit:  Coronary artery disease involving native coronary artery of      Subjective      Patient is a 73-year-old female who returns today for follow up of her coronary artery disease, peripheral arterial disease, and cardiac risk factors.  The patient reports since her last visit she has been having left-sided chest discomfort with radiation into her shoulder associated with dizziness and feeling a \"hot flash\".  Her symptoms have been ongoing for the past 1 to 2 months but have gotten progressively more frequent.  Her last cardiac cath in 2020 showed borderline significant LAD/diagonal disease.  She also reports ongoing shortness of breath with minimal activities.  She continues to smoke approximately 1 pack of cigarettes per day.  She has type 2 diabetes mellitus but has been working to try to improve her hemoglobin A1c.  Her most recent was 6.9.  She was unable to take Jardiance because she could not financially afford it.  She reports having peripheral arterial disease and was referred to [facility].  She reports having a ultrasound of her legs and that she does have poor circulation but they are not recommending any intervention at this time.  Ever since her last PCI which occurred in 12,019 she has been maintained on DAPT with aspirin and Plavix.  She does take losartan but is not on a beta-blocker.  Her blood pressures are controlled but lower end of normal.  She is also compliant with her statin therapy.  She had recent blood work and LDL was at goal.  EKG today shows normal sinus rhythm but a prolonged QT interval.  Previous EKG had prolonged QT interval but current is more prolonged than previous EKG    Review of Systems   Constitutional: Negative for malaise/fatigue.   Eyes:  Negative for vision loss in left eye and vision loss in right eye. " "  Cardiovascular:  Positive for chest pain and dyspnea on exertion. Negative for near-syncope, orthopnea, palpitations, paroxysmal nocturnal dyspnea and syncope.   Musculoskeletal:  Negative for myalgias.   Neurological:  Positive for dizziness. Negative for brief paralysis, excessive daytime sleepiness, focal weakness, numbness, paresthesias and weakness.   All other systems reviewed and are negative.    Allergies   Allergen Reactions    Hydrocodone-Acetaminophen Hives    Atorvastatin Other (See Comments)     Transaminitis         Current Outpatient Medications   Medication Instructions    Apple Cider Vinegar 1,500 mg, Oral, Daily    aspirin 81 mg, Oral, Daily    Biotin 10 MG capsule Oral, Daily    citalopram (CELEXA) 40 mg, Oral, Daily    clopidogrel (PLAVIX) 75 mg, Oral, Daily    cyclobenzaprine (FLEXERIL) 10 mg, Oral, Daily    esomeprazole (NEXIUM) 40 mg, Oral, Every Morning Before Breakfast    loratadine (CLARITIN) 10 mg, Oral, Daily    losartan (COZAAR) 25 mg, Oral, Daily    montelukast (SINGULAIR) 10 mg, Oral, Nightly    Multiple Vitamins-Minerals (PRESERVISION AREDS 2 PO) Oral    rosuvastatin (CRESTOR) 20 mg, Oral, Daily    SITagliptin (JANUVIA) 50 mg, Oral, Daily         Objective     /68 (BP Location: Right arm, Patient Position: Sitting, Cuff Size: Adult)   Pulse 79   Ht 170.2 cm (67\")   Wt 97.3 kg (214 lb 6.4 oz)   SpO2 97%   BMI 33.58 kg/mý       Constitutional:       Appearance: Healthy appearance. Well-developed.   Eyes:      General: Lids are normal. No scleral icterus.     Conjunctiva/sclera: Conjunctivae normal.   HENT:      Head: Normocephalic and atraumatic.   Neck:      Thyroid: No thyromegaly.      Vascular: No carotid bruit or JVD.   Pulmonary:      Effort: Pulmonary effort is normal.      Breath sounds: Normal breath sounds. No wheezing. No rhonchi. No rales.   Cardiovascular:      Normal rate. Regular rhythm.      Murmurs: There is no murmur.      No gallop.  No rub.   Pulses:     " Intact distal pulses.   Edema:     Peripheral edema absent.   Abdominal:      General: There is no distension.      Palpations: Abdomen is soft. There is no abdominal mass.   Musculoskeletal:      Cervical back: Normal range of motion. Skin:     General: Skin is warm and dry.      Findings: No rash.   Neurological:      General: No focal deficit present.      Mental Status: Alert and oriented to person, place, and time.      Gait: Gait is intact.   Psychiatric:         Attention and Perception: Attention normal.         Mood and Affect: Mood normal.         Behavior: Behavior normal.       Result Review  (reviewed with patient):        ECG 12 Lead    Date/Time: 8/8/2023 1:36 PM  Performed by: Shruthi Patricia APRN  Authorized by: Shruthi Patricia APRN   Comparison: compared with previous ECG from 7/13/2021  Comparison to previous ECG: Both EKGs normal sinus rhythm with prolonged QT interval but current EKG has more prolonged QT interval when compared to prior  Rhythm: sinus rhythm  Other findings: prolonged QTc interval    Clinical impression: abnormal EKG  Comments: QTc more prolonged than previous EKG  QT/QTc 484/540 MS         Lab Results   Component Value Date    GLUCOSE 169 (H) 09/26/2022    BUN 8 09/26/2022    CREATININE 1.25 (H) 09/26/2022    EGFR 46.5 (L) 09/26/2022    BCR 6.4 (L) 09/26/2022    K 4.2 09/26/2022    CO2 25.0 09/26/2022    CALCIUM 9.2 09/26/2022    ALBUMIN 3.60 01/18/2022    BILITOT 0.2 01/18/2022    AST 24 01/18/2022    ALT 8 01/18/2022     Lab Results   Component Value Date    WBC 12.88 (H) 09/06/2022    HGB 8.1 (L) 09/06/2022    HCT 27.0 (L) 09/06/2022    MCV 72.2 (L) 09/06/2022     09/06/2022     Lab Results   Component Value Date    CHOL 112 01/18/2022    TRIG 151 (H) 01/18/2022    HDL 35 (L) 01/18/2022    LDL 51 01/18/2022     Lab Results   Component Value Date    HGBA1C 7.65 (H) 01/18/2022     Lab results obtained 7/19/2023: AST 17, ALT 9, total cholesterol 102,  triglycerides 176, HDL 28, LDL 45, hemoglobin A1c 6.9, WBC 10.9, hemoglobin 12, hematocrit 37.8, platelets 317, BUN 9, creatinine 1.37, sodium 139, potassium 3.2      Assessment     Diagnoses and all orders for this visit:    1. Coronary artery disease involving native coronary artery of native heart with angina pectoris (Primary)  Overview:  Cardiac catheterization for abnormal stress (7/30/2019): Patent stents to circumflex.  PCI to first diagonal.  PCI to mid RCA  Cardiac catheterization (7/31/2020): Borderline significant LAD/diagonal bifurcation disease status post acceptable FFR (FFR = 0.87 in diagonal, 0.84 in LAD)    Assessment & Plan:  Patient having left-sided chest discomfort with radiation to her left shoulder for past 1 to 2 months  Obtain myocardial perfusion study as patient had borderline significant LAD/diagonal disease in 2020  Continue aspirin 81 mg daily  Continue Plavix 75 mg daily    Orders:  -     Stress Test With Myocardial Perfusion (1 Day); Future    2. Dyspnea on exertion  Assessment & Plan:  Obtain echocardiogram    Orders:  -     Adult Transthoracic Echo Complete W/ Cont if Necessary Per Protocol; Future    3. Prolonged Q-T interval on ECG  Assessment & Plan:  Patient should start weaning herself off Celexa.  We will contact PCP regarding prolonged QT with Celexa.  She needs to discontinue and may need to find an alternative medication for her depression/anxiety      4. PAD (peripheral artery disease)  Assessment & Plan:  Continue aspirin and statin  Patient following [facility]      5. Hyperlipidemia LDL goal <70  Overview:  High intensity statin therapy indicated given the presence of CAD  History of transaminitis with atorvastatin    Assessment & Plan:  Continue Crestor 20 mg daily      6. Current every day smoker  Assessment & Plan:  Recommend immediate smoking cessation      Other orders  -     ECG 12 Lead          Plan   Patient needs to discontinue Celexa.  She should  wean herself off slowly..  We will contact her PCP office to let them know they need to find alternative medications that are not QT prolonging  Obtain myocardial perfusion study for CCS class III-IV symptoms  Obtain echocardiogram for ongoing dyspnea on exertion  Recommend immediate smoking cessation  Further recommendations to follow      Follow-up   Return in about 6 months (around 2/8/2024), or if symptoms worsen or fail to improve, for Follow-up with Dr. Johnston next visit.      Shruthi Patricia, APRN  8/8/2023

## 2023-08-08 ENCOUNTER — OFFICE VISIT (OUTPATIENT)
Dept: CARDIOLOGY | Facility: CLINIC | Age: 73
End: 2023-08-08
Payer: MEDICARE

## 2023-08-08 VITALS
WEIGHT: 214.4 LBS | HEIGHT: 67 IN | BODY MASS INDEX: 33.65 KG/M2 | DIASTOLIC BLOOD PRESSURE: 68 MMHG | OXYGEN SATURATION: 97 % | HEART RATE: 79 BPM | SYSTOLIC BLOOD PRESSURE: 112 MMHG

## 2023-08-08 DIAGNOSIS — I25.119 CORONARY ARTERY DISEASE INVOLVING NATIVE CORONARY ARTERY OF NATIVE HEART WITH ANGINA PECTORIS: Primary | Chronic | ICD-10-CM

## 2023-08-08 DIAGNOSIS — F17.200 CURRENT EVERY DAY SMOKER: ICD-10-CM

## 2023-08-08 DIAGNOSIS — R94.31 PROLONGED Q-T INTERVAL ON ECG: ICD-10-CM

## 2023-08-08 DIAGNOSIS — R06.09 DYSPNEA ON EXERTION: ICD-10-CM

## 2023-08-08 DIAGNOSIS — I73.9 PAD (PERIPHERAL ARTERY DISEASE): ICD-10-CM

## 2023-08-08 DIAGNOSIS — E78.5 HYPERLIPIDEMIA LDL GOAL <70: Chronic | ICD-10-CM

## 2023-08-08 PROCEDURE — 93000 ELECTROCARDIOGRAM COMPLETE: CPT | Performed by: NURSE PRACTITIONER

## 2023-08-08 PROCEDURE — 99214 OFFICE O/P EST MOD 30 MIN: CPT | Performed by: NURSE PRACTITIONER

## 2023-08-08 RX ORDER — LORATADINE 10 MG/1
10 TABLET ORAL DAILY
COMMUNITY

## 2023-08-08 RX ORDER — LOSARTAN POTASSIUM 25 MG/1
25 TABLET ORAL DAILY
COMMUNITY

## 2023-08-08 NOTE — ASSESSMENT & PLAN NOTE
Patient having left-sided chest discomfort with radiation to her left shoulder for past 1 to 2 months  Obtain myocardial perfusion study as patient had borderline significant LAD/diagonal disease in 2020  Continue aspirin 81 mg daily  Continue Plavix 75 mg daily

## 2023-08-08 NOTE — ASSESSMENT & PLAN NOTE
Patient should start weaning herself off Celexa.  We will contact PCP regarding prolonged QT with Celexa.  She needs to discontinue and may need to find an alternative medication for her depression/anxiety

## 2023-08-15 ENCOUNTER — TELEPHONE (OUTPATIENT)
Dept: CARDIOLOGY | Facility: CLINIC | Age: 73
End: 2023-08-15
Payer: MEDICARE

## 2023-08-15 NOTE — TELEPHONE ENCOUNTER
Spoke with Diya at [facility]. She called with questions regarding what SSRI to change the patient to. Per Janny Patricia APRN- can try Paxil. Dosing up to PCP. Diya will relay to RADHA Bolaños.

## 2023-09-11 ENCOUNTER — HOSPITAL ENCOUNTER (OUTPATIENT)
Dept: CARDIOLOGY | Facility: HOSPITAL | Age: 73
Discharge: HOME | End: 2023-09-11
Payer: MEDICARE

## 2023-09-11 VITALS — WEIGHT: 214 LBS | HEIGHT: 67 IN | BODY MASS INDEX: 33.59 KG/M2

## 2023-09-11 VITALS
HEART RATE: 91 BPM | HEIGHT: 67 IN | BODY MASS INDEX: 33.6 KG/M2 | DIASTOLIC BLOOD PRESSURE: 64 MMHG | SYSTOLIC BLOOD PRESSURE: 125 MMHG | OXYGEN SATURATION: 95 % | WEIGHT: 214.07 LBS

## 2023-09-11 DIAGNOSIS — R06.09 DYSPNEA ON EXERTION: ICD-10-CM

## 2023-09-11 DIAGNOSIS — I25.119 CORONARY ARTERY DISEASE INVOLVING NATIVE CORONARY ARTERY OF NATIVE HEART WITH ANGINA PECTORIS: Primary | Chronic | ICD-10-CM

## 2023-09-11 LAB
BH CV ECHO MEAS - AI P1/2T: 531.4 MSEC
BH CV ECHO MEAS - AO MAX PG: 9.5 MMHG
BH CV ECHO MEAS - AO MEAN PG: 6 MMHG
BH CV ECHO MEAS - AO ROOT DIAM: 3 CM
BH CV ECHO MEAS - AO V2 MAX: 154 CM/SEC
BH CV ECHO MEAS - AO V2 VTI: 30.2 CM
BH CV ECHO MEAS - AVA(I,D): 2.38 CM2
BH CV ECHO MEAS - EDV(CUBED): 47.8 ML
BH CV ECHO MEAS - EDV(MOD-SP2): 84 ML
BH CV ECHO MEAS - EDV(MOD-SP4): 98 ML
BH CV ECHO MEAS - EF(MOD-BP): 61 %
BH CV ECHO MEAS - EF(MOD-SP2): 60.7 %
BH CV ECHO MEAS - EF(MOD-SP4): 58.2 %
BH CV ECHO MEAS - ESV(CUBED): 3.6 ML
BH CV ECHO MEAS - ESV(MOD-SP2): 33 ML
BH CV ECHO MEAS - ESV(MOD-SP4): 41 ML
BH CV ECHO MEAS - FS: 57.7 %
BH CV ECHO MEAS - IVS/LVPW: 1.02 CM
BH CV ECHO MEAS - IVSD: 0.99 CM
BH CV ECHO MEAS - LA DIMENSION: 3.9 CM
BH CV ECHO MEAS - LAT PEAK E' VEL: 5.9 CM/SEC
BH CV ECHO MEAS - LV DIASTOLIC VOL/BSA (35-75): 47.1 CM2
BH CV ECHO MEAS - LV MASS(C)D: 106.1 GRAMS
BH CV ECHO MEAS - LV MAX PG: 5.2 MMHG
BH CV ECHO MEAS - LV MEAN PG: 3 MMHG
BH CV ECHO MEAS - LV SYSTOLIC VOL/BSA (12-30): 19.7 CM2
BH CV ECHO MEAS - LV V1 MAX: 114 CM/SEC
BH CV ECHO MEAS - LV V1 VTI: 22.9 CM
BH CV ECHO MEAS - LVIDD: 3.6 CM
BH CV ECHO MEAS - LVIDS: 1.53 CM
BH CV ECHO MEAS - LVOT AREA: 3.1 CM2
BH CV ECHO MEAS - LVOT DIAM: 2 CM
BH CV ECHO MEAS - LVPWD: 0.97 CM
BH CV ECHO MEAS - MED PEAK E' VEL: 4.15 CM/SEC
BH CV ECHO MEAS - MV A MAX VEL: 157 CM/SEC
BH CV ECHO MEAS - MV DEC SLOPE: 512 CM/SEC2
BH CV ECHO MEAS - MV DEC TIME: 0.18 MSEC
BH CV ECHO MEAS - MV E MAX VEL: 87.1 CM/SEC
BH CV ECHO MEAS - MV E/A: 0.55
BH CV ECHO MEAS - MV MAX PG: 11.5 MMHG
BH CV ECHO MEAS - MV MEAN PG: 4 MMHG
BH CV ECHO MEAS - MV P1/2T: 53.9 MSEC
BH CV ECHO MEAS - MV V2 VTI: 33.7 CM
BH CV ECHO MEAS - MVA(P1/2T): 4.1 CM2
BH CV ECHO MEAS - MVA(VTI): 2.14 CM2
BH CV ECHO MEAS - PA ACC SLOPE: 688 CM/SEC2
BH CV ECHO MEAS - PA ACC TIME: 0.12 SEC
BH CV ECHO MEAS - PA V2 MAX: 99.9 CM/SEC
BH CV ECHO MEAS - RAP SYSTOLE: 3 MMHG
BH CV ECHO MEAS - RVSP: 17 MMHG
BH CV ECHO MEAS - SI(MOD-SP2): 24.5 ML/M2
BH CV ECHO MEAS - SI(MOD-SP4): 27.4 ML/M2
BH CV ECHO MEAS - SV(LVOT): 71.9 ML
BH CV ECHO MEAS - SV(MOD-SP2): 51 ML
BH CV ECHO MEAS - SV(MOD-SP4): 57 ML
BH CV ECHO MEAS - TAPSE (>1.6): 1.7 CM
BH CV ECHO MEAS - TR MAX PG: 13.7 MMHG
BH CV ECHO MEAS - TR MAX VEL: 185 CM/SEC
BH CV ECHO MEASUREMENTS AVERAGE E/E' RATIO: 17.33
BH CV REST NUCLEAR ISOTOPE DOSE: 9.3 MCI
BH CV STRESS BP STAGE 2: NORMAL
BH CV STRESS BP STAGE 4: NORMAL
BH CV STRESS COMMENTS STAGE 1: NORMAL
BH CV STRESS DOSE REGADENOSON STAGE 1: 0.4
BH CV STRESS DURATION MIN STAGE 1: 1
BH CV STRESS DURATION MIN STAGE 2: 1
BH CV STRESS DURATION MIN STAGE 3: 1
BH CV STRESS DURATION MIN STAGE 4: 1
BH CV STRESS DURATION SEC STAGE 1: 10
BH CV STRESS DURATION SEC STAGE 2: 0
BH CV STRESS HR STAGE 1: 90
BH CV STRESS HR STAGE 2: 99
BH CV STRESS HR STAGE 3: 97
BH CV STRESS HR STAGE 4: 96
BH CV STRESS NUCLEAR ISOTOPE DOSE: 30.7 MCI
BH CV STRESS O2 STAGE 1: 95
BH CV STRESS O2 STAGE 2: 94
BH CV STRESS O2 STAGE 3: 98
BH CV STRESS O2 STAGE 4: 97
BH CV STRESS PROTOCOL 1: NORMAL
BH CV STRESS RECOVERY BP: NORMAL MMHG
BH CV STRESS RECOVERY HR: 88 BPM
BH CV STRESS RECOVERY O2: 94 %
BH CV STRESS STAGE 1: 1
BH CV STRESS STAGE 2: 2
BH CV STRESS STAGE 3: 3
BH CV STRESS STAGE 4: 4
BH CV XLRA - RV BASE: 3.2 CM
BH CV XLRA - RV LENGTH: 5.5 CM
BH CV XLRA - RV MID: 2.5 CM
BH CV XLRA - TDI S': 12.4 CM/SEC
LEFT ATRIUM VOLUME INDEX: 25 ML/M2
LV EF 2D ECHO EST: 65 %
LV EF NUC BP: 64 %
MAXIMAL PREDICTED HEART RATE: 149 BPM
PERCENT MAX PREDICTED HR: 67.11 %
STRESS BASELINE BP: NORMAL MMHG
STRESS BASELINE HR: 86 BPM
STRESS O2 SAT REST: 95 %
STRESS PERCENT HR: 79 %
STRESS POST ESTIMATED WORKLOAD: 1 METS
STRESS POST EXERCISE DUR MIN: 4 MIN
STRESS POST EXERCISE DUR SEC: 0 SEC
STRESS POST O2 SAT PEAK: 94 %
STRESS POST PEAK BP: NORMAL MMHG
STRESS POST PEAK HR: 100 BPM
STRESS TARGET HR: 127 BPM

## 2023-09-11 PROCEDURE — 0 TECHNETIUM SESTAMIBI: Performed by: NURSE PRACTITIONER

## 2023-09-11 PROCEDURE — 93017 CV STRESS TEST TRACING ONLY: CPT

## 2023-09-11 PROCEDURE — 25010000002 REGADENOSON 0.4 MG/5ML SOLUTION: Performed by: NURSE PRACTITIONER

## 2023-09-11 PROCEDURE — A9500 TC99M SESTAMIBI: HCPCS | Performed by: NURSE PRACTITIONER

## 2023-09-11 PROCEDURE — 93306 TTE W/DOPPLER COMPLETE: CPT | Performed by: INTERNAL MEDICINE

## 2023-09-11 PROCEDURE — 78452 HT MUSCLE IMAGE SPECT MULT: CPT

## 2023-09-11 PROCEDURE — 78452 HT MUSCLE IMAGE SPECT MULT: CPT | Performed by: INTERNAL MEDICINE

## 2023-09-11 PROCEDURE — 93018 CV STRESS TEST I&R ONLY: CPT | Performed by: INTERNAL MEDICINE

## 2023-09-11 PROCEDURE — 93306 TTE W/DOPPLER COMPLETE: CPT

## 2023-09-11 RX ORDER — CAFFEINE CITRATE 20 MG/ML
60 SOLUTION INTRAVENOUS ONCE
Status: COMPLETED | OUTPATIENT
Start: 2023-09-11 | End: 2023-09-11

## 2023-09-11 RX ORDER — REGADENOSON 0.08 MG/ML
0.4 INJECTION, SOLUTION INTRAVENOUS ONCE
Status: COMPLETED | OUTPATIENT
Start: 2023-09-11 | End: 2023-09-11

## 2023-09-11 RX ADMIN — CAFFEINE CITRATE 60 MG: 20 INJECTION, SOLUTION INTRAVENOUS at 10:37

## 2023-09-11 RX ADMIN — REGADENOSON 0.4 MG: 0.08 INJECTION, SOLUTION INTRAVENOUS at 10:32

## 2023-09-11 RX ADMIN — TECHNETIUM TC 99M SESTAMIBI 1 DOSE: 1 INJECTION INTRAVENOUS at 10:33

## 2023-09-11 RX ADMIN — TECHNETIUM TC 99M SESTAMIBI 1 DOSE: 1 INJECTION INTRAVENOUS at 08:30

## 2023-09-12 ENCOUNTER — TELEPHONE (OUTPATIENT)
Dept: CARDIOLOGY | Facility: CLINIC | Age: 73
End: 2023-09-12
Payer: MEDICARE

## 2023-09-12 DIAGNOSIS — R94.31 PROLONGED Q-T INTERVAL ON ECG: Primary | ICD-10-CM

## 2023-09-12 NOTE — TELEPHONE ENCOUNTER
----- Message from RADHA Kaur sent at 9/12/2023  8:33 AM EDT -----  Regarding: Let know results of echo and stress  I tried to call her but there was no answer and no voicemail.  Please let her know stress and echo were okay.  She was the one supposed to wean off Celexa due to prolonged QT interval as well.  Can we see about her getting a repeat EKG at her convenience?  ----- Message -----  From: True Johnston IV, MD  Sent: 9/11/2023   5:40 PM EDT  To: RADHA Kaur

## 2023-09-21 ENCOUNTER — HOSPITAL ENCOUNTER (OUTPATIENT)
Dept: RESPIRATORY THERAPY | Facility: HOSPITAL | Age: 73
Discharge: HOME | End: 2023-09-21
Admitting: NURSE PRACTITIONER
Payer: MEDICARE

## 2023-09-21 DIAGNOSIS — R94.31 PROLONGED Q-T INTERVAL ON ECG: ICD-10-CM

## 2023-09-21 PROCEDURE — 93005 ELECTROCARDIOGRAM TRACING: CPT | Performed by: NURSE PRACTITIONER

## 2023-09-22 LAB
QT INTERVAL: 406 MS
QTC INTERVAL: 496 MS

## 2023-10-30 ENCOUNTER — TELEPHONE (OUTPATIENT)
Dept: CARDIOLOGY | Facility: CLINIC | Age: 73
End: 2023-10-30

## 2023-10-30 NOTE — TELEPHONE ENCOUNTER
Caller: Orquidea Dodge    Relationship: Self    Best call back number: [phone]    What orders are you requesting (i.e. lab or imaging): AAA US     Where will you receive your lab/imaging services: [facility]     Additional notes: PATIENT WOULD LIKE A REPEAT OF AAA US. REPORTS LAST TIME HER PCP REFERRED HER TO [place] [facility] [place]. STATES SHE DOES NOT WANT TO GO BACK DUE TO HOW FAR AWAY IT IS.

## 2024-05-07 NOTE — ASSESSMENT & PLAN NOTE
Continue Crestor 20 mg daily   Spoke with patient.  He asked about MRI compatibility.  He notes that his dad had some BLE vein claudication/narrowing.  Patient notes that he does have pain in his right leg from hip to ankle.  Discussed with Dr. Weiner--will use ultrasound during procedure and use the other side if necessary.     Additional questions answered for patient and his wife.  No medication holds. Will request procedure.

## 2025-05-01 ENCOUNTER — OFFICE VISIT (OUTPATIENT)
Dept: NEUROSURGERY | Facility: CLINIC | Age: 75
End: 2025-05-01
Payer: MEDICARE

## 2025-05-01 VITALS
HEIGHT: 67 IN | DIASTOLIC BLOOD PRESSURE: 78 MMHG | BODY MASS INDEX: 32.8 KG/M2 | TEMPERATURE: 98 F | WEIGHT: 209 LBS | SYSTOLIC BLOOD PRESSURE: 140 MMHG

## 2025-05-01 DIAGNOSIS — M51.370 DEGENERATION OF INTERVERTEBRAL DISC OF LUMBOSACRAL REGION WITH DISCOGENIC BACK PAIN: ICD-10-CM

## 2025-05-01 DIAGNOSIS — M48.062 LUMBAR STENOSIS WITH NEUROGENIC CLAUDICATION: Primary | ICD-10-CM

## 2025-05-01 PROCEDURE — 99204 OFFICE O/P NEW MOD 45 MIN: CPT | Performed by: PHYSICIAN ASSISTANT

## 2025-05-01 RX ORDER — PANTOPRAZOLE SODIUM 40 MG/1
40 TABLET, DELAYED RELEASE ORAL DAILY
COMMUNITY

## 2025-05-01 RX ORDER — ACYCLOVIR 800 MG/1
TABLET ORAL
COMMUNITY
Start: 2025-04-21

## 2025-05-01 RX ORDER — PAROXETINE 40 MG/1
40 TABLET, FILM COATED ORAL DAILY
COMMUNITY
Start: 2025-03-17

## 2025-05-01 RX ORDER — CHLORAL HYDRATE 500 MG
1000 CAPSULE ORAL DAILY
COMMUNITY

## 2025-05-01 NOTE — PROGRESS NOTES
Patient: Orquidea Dodge  : 1950    Primary Care Provider: Abby Jaime APRN      Chief Complaint: Chronic low back pain right lower extremity pain    History of Present Illness:       Patient is a 74-year-old female from [place] who has a pretty colorful cardiovascular history with coronary artery disease, AAA, type 2 diabetes, chronic smoking.  She also has ulcerations on her aortic arch and has had several small strokes.    Patient has had chronic axial low back pain but has recently developed right hip and buttock pain that will extend down to the right leg somewhat.  Patient presents today with a MRI from about 6 weeks ago.    Patient states that being up and walking is intolerable and going from seated to standing position causes her quite a bit of pain.  Patient has never seen a pain management doctor but is strictly against any opioid medicines.    Review of Systems   Constitutional:  Negative for activity change, appetite change, chills, diaphoresis, fatigue, fever and unexpected weight change.   HENT:  Negative for congestion, dental problem, drooling, ear discharge, ear pain, facial swelling, hearing loss, mouth sores, nosebleeds, postnasal drip, rhinorrhea, sinus pressure, sinus pain, sneezing, sore throat, tinnitus, trouble swallowing and voice change.    Eyes:  Negative for photophobia, pain, discharge, redness, itching and visual disturbance.   Respiratory:  Negative for apnea, cough, choking, chest tightness, shortness of breath, wheezing and stridor.    Cardiovascular:  Negative for chest pain, palpitations and leg swelling.   Gastrointestinal:  Negative for abdominal distention, abdominal pain, anal bleeding, blood in stool, constipation, diarrhea, nausea, rectal pain and vomiting.   Endocrine: Negative for cold intolerance, heat intolerance, polydipsia, polyphagia and polyuria.   Genitourinary:  Negative for decreased urine volume, difficulty urinating, dyspareunia, dysuria,  enuresis, flank pain, frequency, genital sores, hematuria, menstrual problem, pelvic pain, urgency, vaginal bleeding, vaginal discharge and vaginal pain.   Musculoskeletal:  Positive for back pain and gait problem. Negative for arthralgias, joint swelling, myalgias, neck pain and neck stiffness.   Skin:  Negative for color change, pallor, rash and wound.   Allergic/Immunologic: Negative for environmental allergies, food allergies and immunocompromised state.   Neurological:  Negative for dizziness, tremors, seizures, syncope, facial asymmetry, speech difficulty, weakness, light-headedness, numbness and headaches.   Hematological:  Negative for adenopathy. Does not bruise/bleed easily.   Psychiatric/Behavioral:  Negative for agitation, behavioral problems, confusion, decreased concentration, dysphoric mood, hallucinations, self-injury, sleep disturbance and suicidal ideas. The patient is not nervous/anxious and is not hyperactive.        Past Medical History:     Past Medical History:   Diagnosis Date    Acid reflux     Aneurysm     Per patient, found during 2013 [facility]    Chronic pain disorder     COPD (chronic obstructive pulmonary disease)     Coronary artery disease     Depressed     Fractures     Hyperlipidemia     Hypertension     Joint pain     Lumbosacral disc disease     Neuropathy in diabetes     Osteoarthritis        Family History:     Family History   Problem Relation Age of Onset    Alzheimer's disease Mother     Alcohol abuse Father     Coronary artery disease Sister     Hypertension Sister     Coronary artery disease Brother     Hypertension Brother     Arthritis Daughter     Diabetes Daughter     Arthritis Son     Diabetes Son        Social History:    reports that she has been smoking cigarettes. She started smoking about 38 years ago. She has a 53 pack-year smoking history. She has been exposed to tobacco smoke. She has never used smokeless tobacco. She reports current alcohol use. She reports that  "she does not use drugs.   SMOKING STATUS: I spent greater than 10 minutes educating the patient on smoking cessation, and discussed how smoking pertains to the particular disease process at hand.  The patient acknowledges understanding.      Surgical History:     Past Surgical History:   Procedure Laterality Date    APPENDECTOMY      CARDIAC CATHETERIZATION      [facility]    CORONARY STENT PLACEMENT      x3 2019, [facility]    EPIDURAL BLOCK      HIP SURGERY      HYSTERECTOMY      SHOULDER SURGERY      TRIGGER POINT INJECTION         Allergies:   Hydrocodone-acetaminophen and Atorvastatin    Physical Exam:    Vital Signs:/78 (BP Location: Right arm, Patient Position: Sitting, Cuff Size: Adult)   Temp 98 °F (36.7 °C) (Infrared)   Ht 170.2 cm (67\")   Wt 94.8 kg (209 lb)   BMI 32.73 kg/m²    BMI: Body mass index is 32.73 kg/m².     GENERAL:           The patient is in no acute distress, and is able to answer all questions appropriately.    Neck:          Supple without lymphadenopathy    Cardiovascular:       Peripheral pulses 2+ at dorsalis pedis and posterior tibialis    Lungs:         Breathing unlabored    Musculoskeletal:            strength is 5 out of 5 bilaterally.        Shoulder abduction is 5 out of 5.         Dorsiflexion is 5/5 Bilaterally       Plantarflexion is 5/5 bilaterally       Hip Flexion 5/5 bilaterally.         The patient´s gait is antalgic    Neurologic:          The patient is alert and oriented by 3.          Pupils are equal and reactive to light.             Sensation is equal bilaterally with no deficit.           Reflexes:  2+ through out    Medical Decision Making    Data Review:   MRI of the lumbar spine shows a hyperlordotic lumbar spine along with minimal disc bulging but there is a right-sided synovial cyst at the L4-5 facet causing mild to moderate foraminal narrowing but the remainder of the levels are widely patent    Diagnosis:   Right " L4-5 synovial cyst  Chronic low back pain  Tobacco abuse  Type 2 diabetes  AAA  Coronary artery disease  Aortic arch ulcerations    Treatment Options:   From a surgical perspective she is not can have a lot of good options considering her medical comorbidities.    Patient needs pain management with some injections at the right L4-5 facet in particular.    I think her chronic axial back pain may be ever present but some of the right hip and buttock pain may be addressed with some injections.    If minimal invasive options are what would be in her best interest that is not unreasonable but she would need cardiac clearance and she would need to stop smoking prior to any considerations of surgery.    Patient understands this and will call us back on an as-needed basis    BMI is >= 30 and <35. (Class 1 Obesity). The following options were offered after discussion;: weight loss educational material (shared in after visit summary)    No diagnosis found.

## 2025-05-02 DIAGNOSIS — M48.062 LUMBAR STENOSIS WITH NEUROGENIC CLAUDICATION: Primary | ICD-10-CM

## 2025-05-02 DIAGNOSIS — M51.370 DEGENERATION OF INTERVERTEBRAL DISC OF LUMBOSACRAL REGION WITH DISCOGENIC BACK PAIN: ICD-10-CM

## 2025-05-06 ENCOUNTER — TRANSCRIBE ORDERS (OUTPATIENT)
Dept: ADMINISTRATIVE | Facility: HOSPITAL | Age: 75
End: 2025-05-06
Payer: MEDICARE

## 2025-05-06 DIAGNOSIS — F17.210 ENCOUNTER FOR SCREENING FOR MALIGNANT NEOPLASM OF LUNG IN CURRENT SMOKER WITH 20 PACK YEAR HISTORY OR GREATER: Primary | ICD-10-CM

## 2025-05-06 DIAGNOSIS — Z12.31 SCREENING MAMMOGRAM FOR BREAST CANCER: Primary | ICD-10-CM

## 2025-05-06 DIAGNOSIS — Z12.2 ENCOUNTER FOR SCREENING FOR MALIGNANT NEOPLASM OF LUNG IN CURRENT SMOKER WITH 20 PACK YEAR HISTORY OR GREATER: Primary | ICD-10-CM

## 2025-05-12 NOTE — PROGRESS NOTES
"Chief Complaint: \"Lower back and right > left lower extremity pain\"       History of Present Illness: Ms. Orquidea Dodge, 74 y.o. female referred by Jerod Hernandez PA-C in consultation for chronic intractable lower back pain. Patient reports a greater than 30-year history of progressive lower back pain, which began without incident. Orquidea Dodge is an established patient, last seen by Mari for follow up on 07/20/2022. She was scheduled for diagnostic and therapeutic bilateral L4-L5 transforaminal epidural steroid injections, which the patient cancelled due to financial reasons. Orquidea Dodge complains of axial lower back pain and right > left hip and buttock pain associated with intolerance standing and walking and with relief in sitting position. Per Jerod, she is strictly against any opioid medicines. MRI of the lumbar spine shows degenerative changes with disc bulges and facet arthropathy. There appears to be a right-sided synovial cyst at the L4-L5 facet joint causing mild to moderate foraminal stenosis. At L3-L4 moderate right neural foraminal stenosis. Orquidea Dodge underwent neurosurgical consultation with Jerod Hernandez PA-C on 05/01/2025, and was found not to be a surgical candidate. Orquidea Dodge has failed to obtain pain relief with conservative measures for more than 8 months including: oral analgesics, opioids, topical analgesics, ice, heat, physical therapy (in the past year or two), physical therapist directed home exercise program HEP (ongoing), to name a few. Pain has progressed in intensity over the past year.  Pain Description: Constant lower back pain with intermittent exacerbation, described as aching, dull, sharp, throbbing, and burning sensation.   Radiation of Pain: The pain radiates into the right > left gluteal region, right > left hip and down the right > left thigh and down on the right to the right foot  Pain intensity today: 10/10   Average pain intensity " last week: 8/10  Pain intensity ranges from: 5/10 to 10/10  Aggravating factors: Pain increases with standing, walking. Patient describes neurogenic claudication.  Patient reports limitations and general mobility deficits. Patient does not use a cane or walker.   Alleviating factors: Pain decreases with sitting down, sitting on a recliner, lying down  Associated Symptoms:   Patient reports pain, numbness, and weakness in the lower extremities.   Patient denies any new bladder or bowel problems.   Patient reports difficulties with her balance but denies recent falls.   Pain interferes with ADLs, general activities (ability to walk, stand, transition from different positions), and affects patient's quality of life  Pain interferes with sleep: Falling asleep and causing sleep fragmentation   Muscle spasms:   Stiffness:    Review of previous therapies and additional medical records:  Orquidea Dodge has already failed the following measures, including:   Conservative Measures: Oral analgesics, opioids, topical analgesics, ice, heat, physical therapy (in the past year or two), physical therapist directed home exercise program HEP (ongoing),  Interventional Measures:   02/17/2021: Bilateral lumbar RFTC  Numerous procedures in [place]   Surgical Measures: No history of previous cervical spine or lumbar spine surgery. February 25, 2021: ORIF left femur Fx by Dr. Mcneal, [place]   Orquidea Dodge underwent neurosurgical consultation with Jerod Hernandez PA-C on 05/01/2025, and was found not to be a surgical candidate.  Orquidea Dodge underwent neurology consultation with Dr. Henok Warren on June 11, 2019 and was diagnosed with diabetic polyneuropathy.   Orquidea Dodge presents with significant comorbidities including depression, diabetic polyneuropathy associated with type 2 diabetes mellitus, CAD s/p stents x3, PAD, hypertension, HLP, osteoporosis, COPD, MARIANO, nicotine addiction 1 PPD, on aspirin 81 mg,  clopidogrel (Dr Kiarra Elizondo [phone])  In terms of current analgesics, Orquidea Dodge takes: Aleve, cyclobenzaprine. Patient also takes paroxetine   I have reviewed PDMP/Sean Report consistent with medication reconciliation.  SOAPP/ORT: Low Risk     PHQ-2 Depression Screening  Little interest or pleasure in doing things? Several days   Feeling down, depressed, or hopeless? More than half the days   PHQ-2 Total Score 3   Trouble falling or staying asleep, or sleeping too much? Nearly every day   Feeling tired or having little energy? Nearly every day   Poor appetite or overeating? Nearly every day   Feeling bad about yourself - or that you are a failure or have let yourself or your family down? More than half the days   Trouble concentrating on things, such as reading the newspaper or watching television? Several days   Moving or speaking so slowly that other people could have noticed? Or the opposite - being so fidgety or restless that you have been moving around a lot more than usual? Not at all     Thoughts that you would be better off dead, or of hurting yourself in some way? Not at all   PHQ-9 Total Score 15   If you checked off any problems, how difficult have these problems made it for you to do your work, take care of things at home, or get along with other people? Extremely difficult        Pain Self-Efficacy Questionnaire (PSEQ)   ITEM: Scale  0 = Does not agree   6 = Agree  05-13  2025        I can enjoy things despite the pain. 4        I can do most of the household chores (tidying up, washing dishes, etc) despite the pain. 1        I can socialize with my friends or family members as often as I used to do despite the pain. 3        I can cope with my pain in most situations. 4        I can do some form of work despite the pain (housework, paid and unpaid work). 6        I can still do many of the things I enjoy doing (hobbies, leisure activities, etc) despite pain. 3        I can cope with  my pain without medications. 5        I can accomplish most of my goals in life despite the pain. 3        I can live in a normal lifestyle, despite the pain. 4        I can gradually become more active despite the pain. 5        TOTAL SCORE 38/60            Global Pain Scale 05-13 2025          Pain 20          Feelings 21          Clinical outcomes 24          Activities 24          GPS Total: 89              The [place] Back Pain Disability Scale    DATE 05-13 2025          Sleep through the night 5          Turn over in bed 3          Get out of bed 2          Make your bed 4          Put on socks (pantyhose) 4          Ride in a car 4          Sit in a chair for several hours 5          Stand up for 20-30 minutes 5          Climb one flight of stairs 5          Walk a few blocks (200-300 yards)  5          Walk several miles 5          Run one block (about 50 yards) 5          Take food out of the refrigerator 1          Reach up to high shelves 2          Move a chair 2          Pull or push heavy doors 2          Bend over to clean the bathtub 2          Throw a ball 4          Carry two bags of groceries 4          Lift and carry a heavy suitcase 4          Total score 73            [place] Claudication Score  All questions are in reference to an average for the past month 05-13 2025          PAIN FREQUENCY:   How often have you experienced pain in your back or buttock or pain that goes down your back, buttock, and/or legs?  Not at all  Less than once a week  At least once a week  Every day for at least a few minutes  Every day for most of the day  Every minute of the day 5          PAIN SEVERITY:   How would you describe the worst pain you have had in your back, buttock, and/or legs?  0. None  1. Mild  2. Moderate  3. Severe  4. Very severe  5. Intolerable 5          BACK PAIN SEVERITY:   How would you describe the pain or discomfort in your back and/or buttocks?  0. None  1. Mild  2. Moderate  3.  Severe  4. Very severe  5. Intolerable 5          LEG PAIN SEVERITY:   How would you describe the pain or discomfort in your legs or feet?  0. None  1. Mild  2. Moderate  3. Severe  4. Very severe  5. Intolerable 4          NERVE SYMPTOM SEVERITY:   How would you describe the numbness or tingling in your legs or feet?  0. None  1. Mild  2. Moderate  3. Severe  4. Very severe  5. Intolerable 3          LEG WEAKNESS:   How would you describe the strength in your legs, ankles, or feet?  0. None  1. Mild  2. Moderate  3. Severe  4. Very severe  5. Intolerable 4          BALANCE:   Which statement best describes your steadiness when standing or walking?  0. I have no problems with my balance.  1. I sometimes feel off-balance but I am able to walk without using a gait aid.  2. I feel off-balance but I can walk with a gait aid.  3. I am unable to walk without an aid.  4. I have difficulty walking despite using an aid.  5. I cannot stand up. 1          WALKING DISTANCE:   When you went for a walk, how far were you able to walk before your back or leg started giving you trouble?  0. No limits or more than 2 miles   1. More than 1/4 mile but less than 2 miles  2. More than 100 yards but less than 1/4 mile  3. More than 50 feet but less than 100 yards  4. Less than 50 feet  5. Not at all 4          WALKING ABILITY:   Which statement best describes your walking ability?  0. There is no limit to my walking ability.  1. I walk far enough to do everything I want to do.  2. I am able to walk comfortably from my home to the shops or my transport.  3. I am able to walk comfortably around the house.  4. I am able to walk only from the bedroom to the bathroom or kitchen.  5. I am not able to walk at all. 4          WALKING SPEED:   Which statement best describes your walking?  0. I walk at a normal speed and standing upright.  1. I walk slowly but standing upright.  2. I walk slowly and bent forward.  3. I have to stop and stand still  when I walk.  4. I have to stop and sit down when I walk.  5. I cannot walk at all. 4          Total Score: Total Score _____% = (___) x 100                                                              50  39            Review of Diagnostic Studies:  I have independently reviewed and interpreted the images with the patient and used the images and a tridimensional spine model to explain findings. I have also reviewed the reports.  MRI of the lumbar spine on 03/31/2025 revealed preservation of vertebral body heights and alignment. Axial imaging:  L1-L2: Disc bulge. No significant canal or neural foraminal stenosis   L2-L3: Disc bulge, facet arthropathy. No significant canal stenosis. Mild right neural foraminal stenosis  L3-L4: Disc bulge, facet arthropathy. Moderate right and mild left neural foraminal stenosis.  L4-L5: Disc bulge, facet arthropathy. No significant canal or neural foraminal stenosis   L5-S1: Disc bulge, facet arthropathy. Mild left neural foraminal stenosis  Lumbar spine x-rays with flexion-extension views 1/4/2021: Advanced facet arthritis, with no instability seen between flexion-extension views.  MRI of the lumbar spine January 15, 2020: The conus terminates at L1 and has a normal appearance.  Vertebral body height and alignment are preserved. At L4-L5: Severe facet arthritis.  Moderate canal stenosis. No foraminal stenosis. At L5-S1: Severe facet arthritis. No significant canal or foraminal stenosis    The following portions of the patient's history were reviewed and updated as appropriate: problem list, past medical history, past surgery history, social history, family history, medication reconciliation, and allergies    Review of Systems      Patient Active Problem List   Diagnosis    MARIANO (obstructive sleep apnea)    Current every day smoker    Chronic obstructive pulmonary disease    Diabetic polyneuropathy associated with type 2 diabetes mellitus    Osteoporosis    Spondylosis of lumbar  region without myelopathy or radiculopathy    Degeneration of lumbar or lumbosacral intervertebral disc    Type 2 diabetes mellitus    PAD (peripheral artery disease)    Lumbar stenosis with neurogenic claudication    GERD (gastroesophageal reflux disease)    Hyperlipidemia LDL goal <70    Coronary artery disease involving native coronary artery of native heart with angina pectoris    Dyspnea on exertion    Prolonged Q-T interval on ECG    Spondylolisthesis of lumbar region    Gait disturbance    Physical deconditioning    Encounter for smoking cessation counseling    BMI 32.0-32.9,adult       Past Medical History:   Diagnosis Date    Acid reflux     Aneurysm     Per patient, found during 2013 [facility]    Chronic pain disorder     COPD (chronic obstructive pulmonary disease)     Coronary artery disease     Depressed     Extremity pain     Fractures     Hyperlipidemia     Hypertension     Joint pain     Low back pain     Lumbosacral disc disease     Neuropathy in diabetes     Osteoarthritis          Past Surgical History:   Procedure Laterality Date    APPENDECTOMY      CARDIAC CATHETERIZATION      [facility]    CORONARY STENT PLACEMENT      x3 2019, [facility]    EPIDURAL BLOCK      FRACTURE SURGERY      HIP SURGERY      HYSTERECTOMY      ORTHOPEDIC SURGERY      SHOULDER SURGERY      TRIGGER POINT INJECTION           Family History   Problem Relation Age of Onset    Alzheimer's disease Mother     Alcohol abuse Father     Coronary artery disease Sister     Hypertension Sister     Coronary artery disease Brother     Hypertension Brother     Arthritis Daughter     Diabetes Daughter     Arthritis Son     Diabetes Son          Social History     Socioeconomic History    Marital status:    Tobacco Use    Smoking status: Every Day     Current packs/day: 1.00     Average packs/day: 1 pack/day for 53.0 years (53.0 ttl pk-yrs)     Types: Cigarettes     Start date: 7/13/1986     Passive  "exposure: Current    Smokeless tobacco: Never   Vaping Use    Vaping status: Never Used   Substance and Sexual Activity    Alcohol use: Not Currently     Comment: occasional    Drug use: Never    Sexual activity: Not Currently     Birth control/protection: Hysterectomy           Current Outpatient Medications:     aspirin 81 MG EC tablet, Take 1 tablet by mouth Daily., Disp: 90 tablet, Rfl: 3    clopidogrel (PLAVIX) 75 MG tablet, Take 1 tablet by mouth Daily., Disp: , Rfl:     Continuous Glucose Sensor (FreeStyle Yousuf 3 Sensor) misc, , Disp: , Rfl:     cyclobenzaprine (FLEXERIL) 10 MG tablet, Take 1 tablet by mouth Daily., Disp: , Rfl:     esomeprazole (nexIUM) 40 MG capsule, Take 1 capsule by mouth Every Morning Before Breakfast., Disp: , Rfl:     Multiple Vitamins-Minerals (PRESERVISION AREDS 2 PO), Take  by mouth., Disp: , Rfl:     Omega-3 1000 MG capsule, Take 1 capsule by mouth Daily., Disp: , Rfl:     pantoprazole (PROTONIX) 40 MG EC tablet, Take 1 tablet by mouth Daily., Disp: , Rfl:     PARoxetine (PAXIL) 40 MG tablet, Take 1 tablet by mouth Daily. FOR DEPRESSION, Disp: , Rfl:     rosuvastatin (CRESTOR) 20 MG tablet, Take 1 tablet by mouth Daily., Disp: 90 tablet, Rfl: 3    SITagliptin (JANUVIA) 50 MG tablet, Take 1 tablet by mouth Daily., Disp: , Rfl:       Allergies   Allergen Reactions    Hydrocodone-Acetaminophen Hives    Atorvastatin Other (See Comments)     Transaminitis         Ht 170.2 cm (67\")   Wt 96.2 kg (212 lb)   BMI 33.20 kg/m²       Physical Exam:  Constitutional: Patient appears well-developed, well-nourished, well-hydrated  HEENT: Head: Normocephalic and atraumatic  Eyes: Conjunctivae and lids are normal  Pupils: Equal, round, reactive to light  Peripheral vascular exam: Posterior tibialis: right 1+ and left 1+. Dorsalis pedis: right 1+ and left 1+. 1+ No edema. Presence of varicose veins.  Musculoskeletal   Gait and station: Gait evaluation demonstrated shuffling and stooping   Lumbar " "Spine: Passive and active range of motion are limited secondary to pain. Extension, rotation of the lumbar spine increased and reproduced pain. Lumbar facet joint loading maneuvers are positive.  Multifidus Toe Touch Test MT3:  Positive; Prone Instability Test (PIT):  Positive; Multifidus Lift Test (MLT):  \"off\"  Sacroiliac Joints Provocative Maneuvers: Negative   Piriformis maneuvers: Negative   Right Hip Joint: The range of motion of the hip joint is limited to flexion and internal rotation  but without pain   Left Hip Joint: The range of motion of the hip joint is limited to flexion and internal rotation  but without pain   Neurological:   Patient is alert and oriented to person, place, and time.   Speech: Normal.   Cortical function: Normal mental status.   Reflex Scores:  Right patellar: 0+  Left patellar: 0+  Right Achilles: 0+  Left Achilles: 0+  Motor strength: 5/5  Motor Tone: Normal  Involuntary movements: None.   Superficial/Primitive Reflexes: Primitive reflexes were absent.   Right Zavaleta: Absent  Left Zavaleta: Absent  Right ankle clonus: Absent  Left ankle clonus: Absent   Babinsky: Absent  Long tract signs: Negative. Straight leg raising test: Negative.  Femoral stretch sign: Negative.   Sensory exam: Intact to light touch, intact pain and temperature sensation, intact vibration sensation and normal proprioception  Coordination:  Finger to nose: Normal. Romberg's sign: Negative   Skin and subcutaneous tissue: Skin is warm and intact. No rash noted. No cyanosis.   Psychiatric: Judgment and insight: Normal. Recent and remote memory: Intact. Mood and affect: Normal.     ASSESSMENT:   1. Lumbar stenosis with neurogenic claudication    2. Degeneration of intervertebral disc of lumbosacral region with discogenic back pain    3. Spondylolisthesis of lumbar region    4. Diabetic polyneuropathy associated with type 2 diabetes mellitus    5. BMI 32.0-32.9,adult    6. Gait disturbance    7. Physical " deconditioning    8. Current every day smoker    9. Encounter for smoking cessation counseling    10. Preoperative testing        PLAN/MEDICAL DECISION MAKING:  Ms. Orquidea Dodge, 74 y.o. female referred by Jerod Hernandez PA-C in consultation for chronic intractable lower back pain. Patient reports a greater than 30-year history of progressive lower back pain, which began without incident. Orquidea Dodge is an established patient, last seen by Mari for follow up on 07/20/2022. She was scheduled for diagnostic and therapeutic bilateral L4-L5 transforaminal epidural steroid injections, which the patient cancelled due to financial reasons. Orquidea Dodge complains of axial lower back pain and right > left hip and buttock pain associated with intolerance standing and walking and with relief in sitting position. Per Jerod, she is strictly against any opioid medicines. MRI of the lumbar spine shows degenerative changes with disc bulges and facet arthropathy. There appears to be a right-sided synovial cyst at the L4-L5 facet joint causing mild to moderate foraminal stenosis. At L3-L4 moderate right neural foraminal stenosis. Orquidea Dodge underwent neurosurgical consultation with Jerod Hernandez PA-C on 05/01/2025, and was found not to be a surgical candidate. Orquidea Dodge has failed to obtain pain relief with conservative measures for more than 8 months including: oral analgesics, opioids, topical analgesics, ice, heat, physical therapy (in the past year or two), physical therapist directed home exercise program HEP (ongoing), to name a few. Pain has progressed in intensity over the past year. A comprehensive evaluation--including a detailed history, physical examination, and relevant physiologic and functional assessments--was conducted. The patient presents with intractable pain attributed to the diagnoses listed above. This pain has persisted despite trials of conservative modalities, as  documented in the HPI and previous records.The patient's to severe pain significantly impacts daily functioning, activities of daily living (ADLs), and overall quality of life. This is evidenced by results from standardized assessment tools, including:  Global Pain Scale: 89/100  [place] Back Pain Disability Scale: 73/100  [place] Claudication Score: 39/50  Self-reported Clinical Assessment Measures for Sarcopenia: 8/10  Tinetti Gait & Balance Assessment Tool: High fall risk  Additionally, I have reviewed relevant diagnostic imaging and studies supporting the chronicity and severity of the patient’s pain condition. I have also reviewed available medical records, including documentation of previous therapies and treatment outcomes.  Psychological screening was also performed:  PHQ-9: 15  Pain Self-Efficacy Questionnaire (PSEQ): 38/60  Pain nature: The patient’s pain is predominantly physical in nature. I conducted a detailed discussion with Ms. Orquidea Dodge regarding the nature of her chronic pain condition and reviewed potential therapeutic options. This conversation included a review of the risks, benefits, alternatives, and the rationale for each approach. We agreed to pursue a stepwise treatment strategy, beginning with the least invasive interventions appropriate to the severity and complexity of the patient’s condition. The proposed treatment plan is consistent with current clinical guidelines and evidence-based standards of care. Its scope, duration, and setting are medically appropriate, with the intent to improve function, reduce pain, and enhance quality of life. Accordingly, I recommend the following plan:    1. Interventional pain management measures: Patient will need to stop clopidogrel (Plavix) at least 7 days between last dose and procedure. Patient will be scheduled for diagnostic and therapeutic right L3-L4 and right L4-L5 transforaminal epidural steroid injections using the lowest effective  dose of steroids, under C-arm fluoroscopic guidance, with the use of contrast dye unless contraindicated to confirm appropriate needle placement and spread of contrast dye. We may repeat therapeutic right L3-L4 and right L4-L5 transforaminal epidural steroid injections depending on patient's outcome and following current guidelines. Epidurals will be limited to a maximum of 4 sessions per spinal region in a rolling twelve (12) month period. Continuation of epidural steroid injections over 12 months would only be considered under the following provisions;  Patient is a high-risk surgical candidate, or the patient does not desire surgery, or recurrence of pain in the same location relieved with ESIs for at least three months and epidural provides at least 50% sustained improvement of pain and/or 50% objective improvement in function (using same scale as baseline)  Pain is severe enough to cause a significant degree of functional disability or vocational disability  The primary care provider will be notified regarding continuation of procedures and repeat steroid use   Other options for treatment of patient's pain would include   Interspinous spacers: Vertiflex, Minuteman (L3-L4 Vs L4-L5); a spinal cord stimulator trial with Nevro, Medtronic or [place]    2. Diagnostic studies:   A. MRI of the thoracic spine without contrast to assess capacity and patency of the spinal canal and epidural space prior to spinal cord stimulator trial and implant  B. Lumbar Spine X-rays, full views including flexion and extension to assess lumbar stability    3. Pharmacological measures: Reviewed and discussed; Patient takes Aleve, cyclobenzaprine. Patient also takes paroxetine      4. Long-term rehabilitation efforts:  A. The patient does not have a history of falls but has risk factors for falls.  I performed a risk assessment for falls using the Tinetti gait & balance assessment tool (scored moderate/high risk for falls). Fall  precautions: Patient has been instructed regarding universal fall precautions, such as;   Using gait aids a cane and/or a walker at the appropriate height at all times for ambulation   Removing all area rugs and coffee tables to create a safe environment at home  Ensure clean, dry floors  Wearing supportive footwear and properly fitting clothing  Ensure bed/chair is appropriate height and patient's feet can touch the floor  Using a shower transfer bench  Using walk-in shower and having shower safety bars installed  Ensure proper lighting, minimize glare  Have nightlights operational and in use  Participation in an exercise program for gait training, balance training and strength  Avoid carrying laundry up and down steps  Ensure proper compliance and organization of medications to avoid errors   Avoid use of over the counter sedatives and alcohol consumption  Ensure easy access to call bell, glasses, TV control, telephone  Ensure glasses/hearing aids are in use or close by (on top of night table)  B. Patient will start a comprehensive physical therapy program for Alter-G, water therapy, gait and balance training, neurodynamics, core strengthening, gluteal and abductor strengthening, home exercise program, 2-3 x per week for 8 weeks once pain is under control   C. Contrast therapy: Apply ice-packs for 15-20 minutes, followed by heating pads for 15-20 minutes to affected area   D. Start a low impact exercise program such as water therapy, swimming,chair yoga  E. Referral to Dr. Sridhar Wing for psychological clearance for peripheral nerve stimulation, spinal cord stimulation, intrathecal therapies  IRAM. Orquidea Dodge  reports that she has been smoking cigarettes. She started smoking about 38 years ago. She has a 53 pack-year smoking history. She has been exposed to tobacco smoke. She has never used smokeless tobacco. I have educated her on the risk of diseases from using tobacco products such as cancer, COPD,  heart disease, cataracts, and arterial disease. I spent 4 minutes counseling the patient. Referral to Smoking Cessation Program: Quit now [place]: Enroll at www.quitnow[place].org or call 2-849-QUIT-NOW ([phone]). The program provides a holistic approach to smoking cessation including NRT, coaching, etc    5. The patient and her family have been instructed to contact my office with any questions or difficulties. The patient understands the plan and agrees to proceed accordingly.      The patient has a documented plan of care to address chronic pain. Orquidea Dodge reports a pain score of 10/10.  Given her pain assessment as noted, treatment options were discussed and the following options were decided upon as a follow-up plan to address the patient's pain: continuation of current treatment plan for pain, educational materials on pain management, home exercises and therapy, referral to Physical Therapy, referral to specialist for assistance in pain treatment guidance, steroid injections, use of non-medical modalities (ice, heat, stretching and/or behavior modifications), and interventional pain management measures including neuromodulation techniques .             Patient does not receive prescriptions for controlled substances from this office. Therefore, a controlled substance agreement is not medically necessary at this time.   DWAYNE query complete. DWAYNE reviewed by Aashish Allen MD.     Pain Management Panel          Latest Ref Rng & Units 9/15/2022   Pain Management Panel   Creatinine, Urine mg/dL  mg/dL 31.1  31.1       Details          Multiple values from one day are sorted in reverse-chronological order                Pain Medications              aspirin 81 MG EC tablet Take 1 tablet by mouth Daily.    cyclobenzaprine (FLEXERIL) 10 MG tablet Take 1 tablet by mouth Daily.    PARoxetine (PAXIL) 40 MG tablet Take 1 tablet by mouth Daily. FOR DEPRESSION             Orders Placed This Encounter    Procedures    External Facility Surgical/Procedural Request     Standing Status:   Future     Expected Date:   5/13/2025     Expiration Date:   5/13/2026     Provider:   GABE HUNT [[id number]]     Requested Location:   [address] [place] [address]     Procedure/ Order for Consent:   Diagnostic and therapeutic right L3-L4 and right L4-L5 transforaminal epidural steroid injections     Laterality:   Right     Anesthesia type:   Sedation [260]     Pre-op diagnosis:   Lumbar stenosis with neurogenic claudication    XR Spine Lumbar Complete With Flex & Ext     Reason for Exam::   Lumbar spondylolisthesis.  Assess stability     Release to patient:   Routine Release [[id number]]    MRI Thoracic Spine Without Contrast     Release to patient:   Routine Release [[id number]]     Reason for Exam::   Preoperative testing.  Evaluation of spinal canal and epidural space for spinal cord stimulator trial and implant    Ambulatory Referral to Psychology     Referral Priority:   Routine     Referral Type:   Behavorial Health/Psych     Referral Reason:   Specialty Services Required     Referred to Provider:   Sridhar Wing, PhD     Requested Specialty:   Psychology     Number of Visits Requested:   1        Total Time Spent: 47 minutes    Please note that portions of this note were completed with a voice recognition program.   Any copied data in any portion of my note from previous notes included in the HPI, PE, MDM and/or assessment and plan has been reviewed by myself and accurate as of this date.   The 21st Century Cures Act makes medical notes like this available to patients in the interest of transparency. This is a medical document intended as peer to peer communication. It is written in medical language and may contain abbreviations or verbiage that are unfamiliar. It may appear blunt or direct. Medical documents are intended to carry relevant information, facts as evident, and the clinical opinion of the practitioner.      Aashish Allen MD    Patient Care Team:  Abby Jaime APRN as PCP - General (Family Medicine)  True Johnston IV, MD as Cardiologist (Interventional Cardiology)  Mari Jaramillo APRN as Nurse Practitioner (Pain Medicine)  Aashish Allen MD as Consulting Physician (Pain Medicine)  Kiarra Elizondo MD as Consulting Physician (Cardiology)     No orders of the defined types were placed in this encounter.        Future Appointments   Date Time Provider Department Center   5/19/2025  2:00 PM [facility] None   5/21/2025 11:15 AM Aashish Allen MD [facility]

## 2025-05-13 ENCOUNTER — OFFICE VISIT (OUTPATIENT)
Dept: PAIN MEDICINE | Facility: CLINIC | Age: 75
End: 2025-05-13
Payer: MEDICARE

## 2025-05-13 VITALS — HEIGHT: 67 IN | BODY MASS INDEX: 33.27 KG/M2 | WEIGHT: 212 LBS

## 2025-05-13 DIAGNOSIS — M51.370 DEGENERATION OF INTERVERTEBRAL DISC OF LUMBOSACRAL REGION WITH DISCOGENIC BACK PAIN: ICD-10-CM

## 2025-05-13 DIAGNOSIS — M48.062 LUMBAR STENOSIS WITH NEUROGENIC CLAUDICATION: ICD-10-CM

## 2025-05-13 DIAGNOSIS — R26.9 GAIT DISTURBANCE: ICD-10-CM

## 2025-05-13 DIAGNOSIS — F17.200 CURRENT EVERY DAY SMOKER: ICD-10-CM

## 2025-05-13 DIAGNOSIS — E11.42 DIABETIC POLYNEUROPATHY ASSOCIATED WITH TYPE 2 DIABETES MELLITUS: ICD-10-CM

## 2025-05-13 DIAGNOSIS — R53.81 PHYSICAL DECONDITIONING: ICD-10-CM

## 2025-05-13 DIAGNOSIS — M43.16 SPONDYLOLISTHESIS OF LUMBAR REGION: ICD-10-CM

## 2025-05-13 DIAGNOSIS — Z01.818 PREOPERATIVE TESTING: ICD-10-CM

## 2025-05-13 DIAGNOSIS — Z71.6 ENCOUNTER FOR SMOKING CESSATION COUNSELING: ICD-10-CM

## 2025-05-13 PROCEDURE — 1159F MED LIST DOCD IN RCRD: CPT | Performed by: ANESTHESIOLOGY

## 2025-05-13 PROCEDURE — 99406 BEHAV CHNG SMOKING 3-10 MIN: CPT | Performed by: ANESTHESIOLOGY

## 2025-05-13 PROCEDURE — 99215 OFFICE O/P EST HI 40 MIN: CPT | Performed by: ANESTHESIOLOGY

## 2025-05-13 PROCEDURE — 1125F AMNT PAIN NOTED PAIN PRSNT: CPT | Performed by: ANESTHESIOLOGY

## 2025-05-13 PROCEDURE — 1160F RVW MEDS BY RX/DR IN RCRD: CPT | Performed by: ANESTHESIOLOGY

## 2025-05-14 ENCOUNTER — HOSPITAL ENCOUNTER (OUTPATIENT)
Dept: GENERAL RADIOLOGY | Facility: HOSPITAL | Age: 75
Discharge: HOME | End: 2025-05-14
Admitting: ANESTHESIOLOGY
Payer: MEDICARE

## 2025-05-14 ENCOUNTER — TELEPHONE (OUTPATIENT)
Dept: PAIN MEDICINE | Facility: CLINIC | Age: 75
End: 2025-05-14
Payer: MEDICARE

## 2025-05-14 PROCEDURE — 72114 X-RAY EXAM L-S SPINE BENDING: CPT

## 2025-05-14 NOTE — TELEPHONE ENCOUNTER
REQUEST FOR CARDIAC CLEARANCE    Caller name: CAROLYNN PACE    Phone Number: [phone]     Surgeon's name: DR. REINIER BLAKE    Type of planned surgery: INJECTIONS    Date of planned surgery: 5.21.25      Is your doctor requesting for you to stop any of your medications prior to your surgery? PLAVIX    Where should we fax the clearance to? [phone]      SPOKE WITH DAUGHTER (CAROLYNN) SHE STATES THAT CARDIO DOCTOR IS REQUESTING THAT DR. HUNT FAX OVER A CARDIAC CLEARANCE FOR THE PT - PT IS TO STOP PLAVIX FOR A WEEK PRIOR TO THE PROCEDURE    PLEASE CONTACT [facility]

## 2025-05-14 NOTE — TELEPHONE ENCOUNTER
Spoke with Francesca  She reports that they were going to have the provider sign the form and send back.   She reports that they received verbal clearance to hold Plavix.   She will contact the office again to sign the form and fax back to us.   Reiterated appointment for injection.

## 2025-05-15 NOTE — TELEPHONE ENCOUNTER
Spoke with Francesca   Advised that we received verbal clearance from Megha to hold Plavix for 7 days

## 2025-05-15 NOTE — TELEPHONE ENCOUNTER
Spoke with Megha at Dr. Plunkett's office.   He did patient ok to hold Plavix x 7 days and she is sending letter.

## 2025-05-21 ENCOUNTER — OUTSIDE FACILITY SERVICE (OUTPATIENT)
Dept: PAIN MEDICINE | Facility: CLINIC | Age: 75
End: 2025-05-21
Payer: MEDICARE

## 2025-05-21 PROCEDURE — 64483 NJX AA&/STRD TFRM EPI L/S 1: CPT | Performed by: ANESTHESIOLOGY

## 2025-05-21 PROCEDURE — 64484 NJX AA&/STRD TFRM EPI L/S EA: CPT | Performed by: ANESTHESIOLOGY

## 2025-05-22 ENCOUNTER — TELEPHONE (OUTPATIENT)
Dept: PAIN MEDICINE | Facility: CLINIC | Age: 75
End: 2025-05-22
Payer: MEDICARE

## 2025-05-22 NOTE — TELEPHONE ENCOUNTER
S/w patient to see how she is doing after her procedure from yesterday with Dr. Allen. Patient states that she is doing well with a pain level of 4.     Scheduled for a mychart video visit on 08/07/2025 with Dr. Allen. Closing TE.

## 2025-06-11 ENCOUNTER — TRANSCRIBE ORDERS (OUTPATIENT)
Dept: ADMINISTRATIVE | Facility: HOSPITAL | Age: 75
End: 2025-06-11
Payer: MEDICARE

## 2025-06-11 ENCOUNTER — HOSPITAL ENCOUNTER (OUTPATIENT)
Facility: HOSPITAL | Age: 75
Discharge: HOME | End: 2025-06-11
Admitting: NURSE PRACTITIONER
Payer: MEDICARE

## 2025-06-11 DIAGNOSIS — R93.89 ABNORMAL RADIOLOGICAL FINDINGS IN SKIN AND SUBCUTANEOUS TISSUE: Primary | ICD-10-CM

## 2025-06-11 DIAGNOSIS — F17.210 ENCOUNTER FOR SCREENING FOR MALIGNANT NEOPLASM OF LUNG IN CURRENT SMOKER WITH 20 PACK YEAR HISTORY OR GREATER: ICD-10-CM

## 2025-06-11 DIAGNOSIS — Z12.2 ENCOUNTER FOR SCREENING FOR MALIGNANT NEOPLASM OF LUNG IN CURRENT SMOKER WITH 20 PACK YEAR HISTORY OR GREATER: ICD-10-CM

## 2025-06-11 PROCEDURE — 71271 CT THORAX LUNG CANCER SCR C-: CPT

## 2025-06-11 PROCEDURE — 71271 CT THORAX LUNG CANCER SCR C-: CPT | Performed by: RADIOLOGY

## 2025-06-12 ENCOUNTER — HOSPITAL ENCOUNTER (OUTPATIENT)
Facility: HOSPITAL | Age: 75
Discharge: HOME | End: 2025-06-12
Payer: MEDICARE

## 2025-06-12 DIAGNOSIS — R93.89 ABNORMAL RADIOLOGICAL FINDINGS IN SKIN AND SUBCUTANEOUS TISSUE: ICD-10-CM

## 2025-06-12 PROCEDURE — 71250 CT THORAX DX C-: CPT

## 2025-06-12 PROCEDURE — 71250 CT THORAX DX C-: CPT | Performed by: RADIOLOGY

## 2025-06-23 ENCOUNTER — OFFICE VISIT (OUTPATIENT)
Dept: PULMONOLOGY | Facility: CLINIC | Age: 75
End: 2025-06-23
Payer: MEDICARE

## 2025-06-23 VITALS
OXYGEN SATURATION: 97 % | HEART RATE: 91 BPM | HEIGHT: 67 IN | SYSTOLIC BLOOD PRESSURE: 162 MMHG | TEMPERATURE: 97.4 F | DIASTOLIC BLOOD PRESSURE: 90 MMHG | BODY MASS INDEX: 33.21 KG/M2 | WEIGHT: 211.6 LBS

## 2025-06-23 DIAGNOSIS — R06.09 DYSPNEA ON EXERTION: ICD-10-CM

## 2025-06-23 DIAGNOSIS — J44.9 COPD MIXED TYPE: ICD-10-CM

## 2025-06-23 DIAGNOSIS — K21.9 CHRONIC GERD: ICD-10-CM

## 2025-06-23 DIAGNOSIS — R91.8 MULTIPLE PULMONARY NODULES: Primary | ICD-10-CM

## 2025-06-23 DIAGNOSIS — F17.200 CURRENT EVERY DAY SMOKER: ICD-10-CM

## 2025-06-23 DIAGNOSIS — Z72.0 TOBACCO ABUSE: ICD-10-CM

## 2025-06-23 PROCEDURE — G2211 COMPLEX E/M VISIT ADD ON: HCPCS | Performed by: INTERNAL MEDICINE

## 2025-06-23 PROCEDURE — 99205 OFFICE O/P NEW HI 60 MIN: CPT | Performed by: INTERNAL MEDICINE

## 2025-06-23 NOTE — PROGRESS NOTES
PULMONARY  NOTE    Chief Complaint     Pulmonary nodules, chronic cough, chronic GERD, tobacco abuse, COPD    History of Present Illness     75-year-old female referred for abnormal chest imaging    Long history of tobacco abuse which is ongoing  Currently no plans for smoking cessation    She has not gotten screening CT scans of the chest in the past  Most recent CT scan of the chest is as noted below    No recent pneumonia or bronchitis  She does have a chronic cough, however  Her cough typically wakes her from sleep at night  She has had no hemoptysis    She has history of COPD    She has chronic reflux  She takes multiple medications to help with reflux but has prolific symptoms  She will wake up at night with vomitus in her mouth  She does not follow reflux precautions    Patient Active Problem List   Diagnosis    MARIANO (obstructive sleep apnea)    Current every day smoker    Diabetic polyneuropathy associated with type 2 diabetes mellitus    Osteoporosis    Spondylosis of lumbar region without myelopathy or radiculopathy    Degeneration of lumbar or lumbosacral intervertebral disc    Type 2 diabetes mellitus    PAD (peripheral artery disease)    Lumbar stenosis with neurogenic claudication    Chronic GERD    Hyperlipidemia LDL goal <70    Coronary artery disease involving native coronary artery of native heart with angina pectoris    Dyspnea on exertion    Prolonged Q-T interval on ECG    Spondylolisthesis of lumbar region    Gait disturbance    Physical deconditioning    Encounter for smoking cessation counseling    BMI 32.0-32.9,adult    Chronic obstructive pulmonary disease    Tobacco abuse    Multiple pulmonary nodules (Max 11mm new from 2024)      Allergies   Allergen Reactions    Hydrocodone-Acetaminophen Hives    Atorvastatin Other (See Comments)     Transaminitis       Current Outpatient Medications:     aspirin 81 MG EC tablet, Take 1 tablet by mouth Daily., Disp: 90 tablet, Rfl: 3    clopidogrel  (PLAVIX) 75 MG tablet, Take 1 tablet by mouth Daily., Disp: , Rfl:     Continuous Glucose Sensor (FreeStyle Yousuf 3 Sensor) misc, , Disp: , Rfl:     cyclobenzaprine (FLEXERIL) 10 MG tablet, Take 1 tablet by mouth Daily., Disp: , Rfl:     esomeprazole (nexIUM) 40 MG capsule, Take 1 capsule by mouth Every Morning Before Breakfast., Disp: , Rfl:     Multiple Vitamins-Minerals (PRESERVISION AREDS 2 PO), Take  by mouth., Disp: , Rfl:     Omega-3 1000 MG capsule, Take 1 capsule by mouth Daily., Disp: , Rfl:     pantoprazole (PROTONIX) 40 MG EC tablet, Take 1 tablet by mouth Daily., Disp: , Rfl:     PARoxetine (PAXIL) 40 MG tablet, Take 1 tablet by mouth Daily. FOR DEPRESSION, Disp: , Rfl:     rosuvastatin (CRESTOR) 20 MG tablet, Take 1 tablet by mouth Daily., Disp: 90 tablet, Rfl: 3    SITagliptin (JANUVIA) 50 MG tablet, Take 1 tablet by mouth Daily., Disp: , Rfl:   MEDICATION LIST AND ALLERGIES REVIEWED.    Family History   Problem Relation Age of Onset    Alzheimer's disease Mother     Alcohol abuse Father     Coronary artery disease Sister     Hypertension Sister     Coronary artery disease Brother     Hypertension Brother     Arthritis Daughter     Diabetes Daughter     Arthritis Son     Diabetes Son      Social History     Tobacco Use    Smoking status: Every Day     Current packs/day: 1.00     Average packs/day: 1 pack/day for 48.5 years (48.5 ttl pk-yrs)     Types: Cigarettes     Start date: 1977     Passive exposure: Current    Smokeless tobacco: Never   Vaping Use    Vaping status: Never Used   Substance Use Topics    Alcohol use: Not Currently     Comment: occasional    Drug use: Never     Social History     Social History Narrative    Ongoing Bacot abuse    Currently no plans for smoking cessation     FAMILY AND SOCIAL HISTORY REVIEWED.    Review of Systems  IF PRESENT REFER TO SCANNED ROS SHEET FROM SAME DATE  OTHERWISE ROS OBTAINED AND NON-CONTRIBUTORY OVER HPI.    /90   Pulse 91   Temp 97.4 °F (36.3  "°C)   Ht 170.2 cm (67\")   Wt 96 kg (211 lb 9.6 oz)   SpO2 97%   BMI 33.14 kg/m²   Physical Exam  Vitals and nursing note reviewed.   Constitutional:       General: She is not in acute distress.     Appearance: She is well-developed. She is not diaphoretic.   HENT:      Head: Normocephalic and atraumatic.   Neck:      Thyroid: No thyromegaly.   Cardiovascular:      Rate and Rhythm: Normal rate and regular rhythm.      Heart sounds: Normal heart sounds. No murmur heard.  Pulmonary:      Effort: Pulmonary effort is normal.      Breath sounds: Normal breath sounds. No stridor.   Lymphadenopathy:      Cervical: No cervical adenopathy.      Upper Body:      Right upper body: No supraclavicular or epitrochlear adenopathy.      Left upper body: No supraclavicular or epitrochlear adenopathy.   Skin:     General: Skin is warm and dry.   Neurological:      Mental Status: She is alert.   Psychiatric:         Behavior: Behavior normal.         Results     CT scans of the chest reviewed on PACS  My interpretation as noted below    Immunization History   Administered Date(s) Administered    COVID-19 (PFIZER) BIVALENT 12+YRS 09/19/2022    COVID-19 (PFIZER) Purple Cap Monovalent 05/29/2021, 06/19/2021, 01/15/2022     Problem List       ICD-10-CM ICD-9-CM   1. Multiple pulmonary nodules (Max 11mm new from 2024)  R91.8 793.19   2. Tobacco abuse  Z72.0 305.1   3. Dyspnea on exertion  R06.09 786.09   4. Current every day smoker  F17.200 305.1   5. Chronic obstructive pulmonary disease  J44.9 496   6. Chronic GERD  K21.9 530.81       Discussion     We reviewed her chest imaging from this year and last year  She has a couple nodules that are stable in comparison to prior scans  She has a couple nodules that are new in comparison to 2024  These are primarily on the left side  Malignancy is a consideration but inflammatory nodules, such as due to chronic reflux and aspiration are a consideration as well    A biopsy can be " considered  These lesions would be best amenable to navigational bronchoscopy  We would need to have a robotic protocol CT scan of the chest before we can consider that    Our plan is to get a follow-up CT scan of the chest with robotic protocol  If the nodules have shown any interval worsening then we will consider bronchoscopy with biopsy    We discussed the need for reflux precautions  I gave her written reflux recommendations  I have strongly encouraged her to be strictly n.p.o. 2 hours before going to bed at night and sleeping elevated    Level of service justified based on 62 minutes spent in patient care on this date of service including, but not limited to: preparing to see the patient, obtaining and/or reviewing history, performing medically appropriate examination, ordering tests/medicine/procedures, independently interpreting results, documenting clinical information in EHR, and counseling/education of patient/family/caregiver (excluding time spent on other separate services such as performing procedures or test interpretation, if applicable). (Level 4 45-59 minutes; Level 5 60-74 minutes)    Lon Hoffman MD  Note electronically signed    CC: Manasa Urrutia APRN

## 2025-07-17 ENCOUNTER — TELEPHONE (OUTPATIENT)
Dept: PAIN MEDICINE | Facility: CLINIC | Age: 75
End: 2025-07-17
Payer: MEDICARE

## 2025-08-11 ENCOUNTER — HOSPITAL ENCOUNTER (OUTPATIENT)
Dept: CT IMAGING | Facility: HOSPITAL | Age: 75
Discharge: HOME | End: 2025-08-11
Admitting: INTERNAL MEDICINE
Payer: MEDICARE

## 2025-08-11 DIAGNOSIS — K21.9 CHRONIC GERD: ICD-10-CM

## 2025-08-11 DIAGNOSIS — J44.9 COPD MIXED TYPE: ICD-10-CM

## 2025-08-11 DIAGNOSIS — R91.8 MULTIPLE PULMONARY NODULES: ICD-10-CM

## 2025-08-11 DIAGNOSIS — F17.200 CURRENT EVERY DAY SMOKER: ICD-10-CM

## 2025-08-11 DIAGNOSIS — Z72.0 TOBACCO ABUSE: ICD-10-CM

## 2025-08-11 DIAGNOSIS — R06.09 DYSPNEA ON EXERTION: ICD-10-CM

## 2025-08-11 PROCEDURE — 71250 CT THORAX DX C-: CPT

## 2025-08-14 ENCOUNTER — APPOINTMENT (OUTPATIENT)
Dept: CT IMAGING | Facility: HOSPITAL | Age: 75
End: 2025-08-14
Payer: MEDICARE

## 2025-08-14 ENCOUNTER — HOSPITAL ENCOUNTER (EMERGENCY)
Facility: HOSPITAL | Age: 75
Discharge: LEFT BEFORE DISCHARGE | End: 2025-08-14
Attending: STUDENT IN AN ORGANIZED HEALTH CARE EDUCATION/TRAINING PROGRAM
Payer: MEDICARE

## 2025-08-14 ENCOUNTER — APPOINTMENT (OUTPATIENT)
Dept: GENERAL RADIOLOGY | Facility: HOSPITAL | Age: 75
End: 2025-08-14
Payer: MEDICARE

## 2025-08-14 VITALS
OXYGEN SATURATION: 81 % | DIASTOLIC BLOOD PRESSURE: 74 MMHG | BODY MASS INDEX: 31.39 KG/M2 | HEART RATE: 107 BPM | RESPIRATION RATE: 16 BRPM | WEIGHT: 200 LBS | SYSTOLIC BLOOD PRESSURE: 144 MMHG | HEIGHT: 67 IN | TEMPERATURE: 98.4 F

## 2025-08-14 DIAGNOSIS — J96.01 ACUTE RESPIRATORY FAILURE WITH HYPOXIA: Primary | ICD-10-CM

## 2025-08-14 DIAGNOSIS — J44.1 COPD WITH ACUTE EXACERBATION: ICD-10-CM

## 2025-08-14 DIAGNOSIS — R53.1 GENERALIZED WEAKNESS: ICD-10-CM

## 2025-08-14 DIAGNOSIS — R29.6 RECURRENT FALLS: ICD-10-CM

## 2025-08-14 LAB
A-A DO2: 35.4 MMHG (ref 0–300)
ALBUMIN SERPL-MCNC: 3.5 G/DL (ref 3.5–5.2)
ALBUMIN/GLOB SERPL: 1.3 G/DL
ALP SERPL-CCNC: 96 U/L (ref 39–117)
ALT SERPL W P-5'-P-CCNC: 9 U/L (ref 1–33)
AMMONIA BLD-SCNC: 24 UMOL/L (ref 11–51)
ANION GAP SERPL CALCULATED.3IONS-SCNC: 13.3 MMOL/L (ref 5–15)
ANISOCYTOSIS BLD QL: NORMAL
ARTERIAL PATENCY WRIST A: POSITIVE
AST SERPL-CCNC: 17 U/L (ref 1–32)
ATMOSPHERIC PRESS: 726 MMHG
BASE EXCESS BLDA CALC-SCNC: 2.9 MMOL/L (ref 0–2)
BDY SITE: ABNORMAL
BILIRUB SERPL-MCNC: 0.2 MG/DL (ref 0–1.2)
BUN SERPL-MCNC: 8.4 MG/DL (ref 8–23)
BUN/CREAT SERPL: 7.9 (ref 7–25)
CALCIUM SPEC-SCNC: 8.8 MG/DL (ref 8.6–10.5)
CHLORIDE SERPL-SCNC: 98 MMOL/L (ref 98–107)
CO2 BLDA-SCNC: 29.1 MMOL/L (ref 22–33)
CO2 SERPL-SCNC: 24.7 MMOL/L (ref 22–29)
COHGB MFR BLD: 3.7 % (ref 0–5)
CREAT SERPL-MCNC: 1.06 MG/DL (ref 0.57–1)
CRP SERPL-MCNC: 3.79 MG/DL (ref 0–0.5)
D-LACTATE SERPL-SCNC: 2.7 MMOL/L (ref 0.5–2)
DEPRECATED RDW RBC AUTO: 51.9 FL (ref 37–54)
EGFRCR SERPLBLD CKD-EPI 2021: 55.6 ML/MIN/1.73
EOSINOPHIL # BLD MANUAL: 0.24 10*3/MM3 (ref 0–0.4)
EOSINOPHIL NFR BLD MANUAL: 4 % (ref 0.3–6.2)
ERYTHROCYTE [DISTWIDTH] IN BLOOD BY AUTOMATED COUNT: 16.4 % (ref 12.3–15.4)
GEN 5 1HR TROPONIN T REFLEX: 26 NG/L
GLOBULIN UR ELPH-MCNC: 2.6 GM/DL
GLUCOSE SERPL-MCNC: 122 MG/DL (ref 65–99)
HCO3 BLDA-SCNC: 27.8 MMOL/L (ref 20–26)
HCT VFR BLD AUTO: 29.2 % (ref 34–46.6)
HCT VFR BLD CALC: 25.2 % (ref 38–51)
HGB BLD-MCNC: 8.5 G/DL (ref 12–15.9)
HGB BLDA-MCNC: 8.2 G/DL (ref 13.5–17.5)
HOLD SPECIMEN: NORMAL
HOLD SPECIMEN: NORMAL
HYPOCHROMIA BLD QL: NORMAL
INHALED O2 CONCENTRATION: 21 %
LIPASE SERPL-CCNC: 19 U/L (ref 13–60)
LYMPHOCYTES # BLD MANUAL: 2.06 10*3/MM3 (ref 0.7–3.1)
LYMPHOCYTES NFR BLD MANUAL: 8 % (ref 5–12)
Lab: ABNORMAL
MCH RBC QN AUTO: 25.3 PG (ref 26.6–33)
MCHC RBC AUTO-ENTMCNC: 29.1 G/DL (ref 31.5–35.7)
MCV RBC AUTO: 86.9 FL (ref 79–97)
METHGB BLD QL: 0.4 % (ref 0–3)
MODALITY: ABNORMAL
MONOCYTES # BLD: 0.47 10*3/MM3 (ref 0.1–0.9)
NEUTROPHILS # BLD AUTO: 3.12 10*3/MM3 (ref 1.7–7)
NEUTROPHILS NFR BLD MANUAL: 53 % (ref 42.7–76)
OXYHGB MFR BLDV: 85.8 % (ref 94–99)
PCO2 BLDA: 43.4 MM HG (ref 35–45)
PCO2 TEMP ADJ BLD: ABNORMAL MM[HG]
PH BLDA: 7.41 PH UNITS (ref 7.35–7.45)
PH, TEMP CORRECTED: ABNORMAL
PLAT MORPH BLD: NORMAL
PLATELET # BLD AUTO: 280 10*3/MM3 (ref 140–450)
PMV BLD AUTO: 8.3 FL (ref 6–12)
PO2 BLDA: 58.1 MM HG (ref 83–108)
PO2 TEMP ADJ BLD: ABNORMAL MM[HG]
POTASSIUM SERPL-SCNC: 3.5 MMOL/L (ref 3.5–5.2)
PROCALCITONIN SERPL-MCNC: 0.09 NG/ML (ref 0–0.25)
PROT SERPL-MCNC: 6.1 G/DL (ref 6–8.5)
RBC # BLD AUTO: 3.36 10*6/MM3 (ref 3.77–5.28)
SAO2 % BLDCOA: 89.5 % (ref 94–99)
SODIUM SERPL-SCNC: 136 MMOL/L (ref 136–145)
TROPONIN T % DELTA: -13
TROPONIN T NUMERIC DELTA: -4 NG/L
TROPONIN T SERPL HS-MCNC: 30 NG/L
VARIANT LYMPHS NFR BLD MANUAL: 35 % (ref 19.6–45.3)
VENTILATOR MODE: ABNORMAL
WBC NRBC COR # BLD AUTO: 5.88 10*3/MM3 (ref 3.4–10.8)
WHOLE BLOOD HOLD COAG: NORMAL
WHOLE BLOOD HOLD SPECIMEN: NORMAL

## 2025-08-14 PROCEDURE — 71275 CT ANGIOGRAPHY CHEST: CPT

## 2025-08-14 PROCEDURE — 84145 PROCALCITONIN (PCT): CPT | Performed by: PHYSICIAN ASSISTANT

## 2025-08-14 PROCEDURE — 99285 EMERGENCY DEPT VISIT HI MDM: CPT

## 2025-08-14 PROCEDURE — 70450 CT HEAD/BRAIN W/O DYE: CPT

## 2025-08-14 PROCEDURE — 82375 ASSAY CARBOXYHB QUANT: CPT

## 2025-08-14 PROCEDURE — 70450 CT HEAD/BRAIN W/O DYE: CPT | Performed by: RADIOLOGY

## 2025-08-14 PROCEDURE — 86140 C-REACTIVE PROTEIN: CPT | Performed by: PHYSICIAN ASSISTANT

## 2025-08-14 PROCEDURE — 83690 ASSAY OF LIPASE: CPT | Performed by: PHYSICIAN ASSISTANT

## 2025-08-14 PROCEDURE — 85007 BL SMEAR W/DIFF WBC COUNT: CPT | Performed by: PHYSICIAN ASSISTANT

## 2025-08-14 PROCEDURE — 82805 BLOOD GASES W/O2 SATURATION: CPT

## 2025-08-14 PROCEDURE — 83050 HGB METHEMOGLOBIN QUAN: CPT

## 2025-08-14 PROCEDURE — 71045 X-RAY EXAM CHEST 1 VIEW: CPT

## 2025-08-14 PROCEDURE — 93005 ELECTROCARDIOGRAM TRACING: CPT | Performed by: PHYSICIAN ASSISTANT

## 2025-08-14 PROCEDURE — 36415 COLL VENOUS BLD VENIPUNCTURE: CPT | Performed by: PHYSICIAN ASSISTANT

## 2025-08-14 PROCEDURE — 85025 COMPLETE CBC W/AUTO DIFF WBC: CPT | Performed by: PHYSICIAN ASSISTANT

## 2025-08-14 PROCEDURE — 71275 CT ANGIOGRAPHY CHEST: CPT | Performed by: RADIOLOGY

## 2025-08-14 PROCEDURE — 84484 ASSAY OF TROPONIN QUANT: CPT | Performed by: PHYSICIAN ASSISTANT

## 2025-08-14 PROCEDURE — 25510000001 IOPAMIDOL PER 1 ML: Performed by: STUDENT IN AN ORGANIZED HEALTH CARE EDUCATION/TRAINING PROGRAM

## 2025-08-14 PROCEDURE — 82140 ASSAY OF AMMONIA: CPT | Performed by: PHYSICIAN ASSISTANT

## 2025-08-14 PROCEDURE — 71045 X-RAY EXAM CHEST 1 VIEW: CPT | Performed by: RADIOLOGY

## 2025-08-14 PROCEDURE — 80053 COMPREHEN METABOLIC PANEL: CPT | Performed by: PHYSICIAN ASSISTANT

## 2025-08-14 PROCEDURE — 25810000003 SODIUM CHLORIDE 0.9 % SOLUTION: Performed by: PHYSICIAN ASSISTANT

## 2025-08-14 PROCEDURE — 83605 ASSAY OF LACTIC ACID: CPT | Performed by: PHYSICIAN ASSISTANT

## 2025-08-14 PROCEDURE — 36600 WITHDRAWAL OF ARTERIAL BLOOD: CPT

## 2025-08-14 RX ORDER — IOPAMIDOL 755 MG/ML
100 INJECTION, SOLUTION INTRAVASCULAR
Status: COMPLETED | OUTPATIENT
Start: 2025-08-14 | End: 2025-08-14

## 2025-08-14 RX ORDER — PREDNISONE 10 MG/1
10 TABLET ORAL DAILY
Qty: 30 TABLET | Refills: 0 | Status: SHIPPED | OUTPATIENT
Start: 2025-08-14

## 2025-08-14 RX ADMIN — IOPAMIDOL 80 ML: 755 INJECTION, SOLUTION INTRAVENOUS at 17:33

## 2025-08-14 RX ADMIN — SODIUM CHLORIDE 500 ML: 9 INJECTION, SOLUTION INTRAVENOUS at 17:06

## 2025-08-14 RX ADMIN — SODIUM CHLORIDE 500 ML: 9 INJECTION, SOLUTION INTRAVENOUS at 15:28

## 2025-08-15 LAB
QT INTERVAL: 462 MS
QTC INTERVAL: 505 MS

## 2025-08-25 ENCOUNTER — OFFICE VISIT (OUTPATIENT)
Dept: PULMONOLOGY | Facility: CLINIC | Age: 75
End: 2025-08-25
Payer: MEDICARE

## 2025-08-25 VITALS
DIASTOLIC BLOOD PRESSURE: 88 MMHG | WEIGHT: 205.4 LBS | TEMPERATURE: 97.3 F | BODY MASS INDEX: 32.24 KG/M2 | OXYGEN SATURATION: 96 % | SYSTOLIC BLOOD PRESSURE: 142 MMHG | HEIGHT: 67 IN | HEART RATE: 91 BPM

## 2025-08-25 DIAGNOSIS — R91.8 MULTIPLE PULMONARY NODULES: ICD-10-CM

## 2025-08-25 DIAGNOSIS — Z72.0 TOBACCO ABUSE: ICD-10-CM

## 2025-08-25 DIAGNOSIS — K21.9 CHRONIC GERD: ICD-10-CM

## 2025-08-25 DIAGNOSIS — J44.9 COPD MIXED TYPE: Primary | ICD-10-CM

## 2025-08-25 PROCEDURE — 99215 OFFICE O/P EST HI 40 MIN: CPT | Performed by: INTERNAL MEDICINE

## 2025-08-25 PROCEDURE — G2211 COMPLEX E/M VISIT ADD ON: HCPCS | Performed by: INTERNAL MEDICINE

## 2025-08-25 RX ORDER — GABAPENTIN 300 MG/1
1 CAPSULE ORAL EVERY 12 HOURS SCHEDULED
COMMUNITY
Start: 2025-07-12

## 2025-08-25 RX ORDER — MONTELUKAST SODIUM 10 MG/1
TABLET ORAL
COMMUNITY
Start: 2025-07-10

## 2025-08-25 RX ORDER — KETOROLAC TROMETHAMINE 10 MG/1
TABLET, FILM COATED ORAL
COMMUNITY
Start: 2025-07-07

## 2025-08-25 RX ORDER — MIDODRINE HYDROCHLORIDE 2.5 MG/1
2.5 TABLET ORAL
COMMUNITY
Start: 2025-06-30

## 2025-08-25 RX ORDER — HYDROCHLOROTHIAZIDE 12.5 MG/1
TABLET ORAL
COMMUNITY
Start: 2025-08-07

## 2025-08-28 ENCOUNTER — TELEPHONE (OUTPATIENT)
Dept: PAIN MEDICINE | Facility: CLINIC | Age: 75
End: 2025-08-28
Payer: MEDICARE